# Patient Record
Sex: FEMALE | Race: WHITE | NOT HISPANIC OR LATINO | Employment: FULL TIME | URBAN - METROPOLITAN AREA
[De-identification: names, ages, dates, MRNs, and addresses within clinical notes are randomized per-mention and may not be internally consistent; named-entity substitution may affect disease eponyms.]

---

## 2019-04-19 ENCOUNTER — APPOINTMENT (OUTPATIENT)
Dept: WOMENS IMAGING | Facility: HOSPITAL | Age: 39
End: 2019-04-19

## 2019-04-19 PROCEDURE — 77063 BREAST TOMOSYNTHESIS BI: CPT | Performed by: RADIOLOGY

## 2019-04-19 PROCEDURE — 77067 SCR MAMMO BI INCL CAD: CPT | Performed by: RADIOLOGY

## 2020-09-25 ENCOUNTER — APPOINTMENT (OUTPATIENT)
Dept: WOMENS IMAGING | Facility: HOSPITAL | Age: 40
End: 2020-09-25

## 2020-09-25 PROCEDURE — 77063 BREAST TOMOSYNTHESIS BI: CPT | Performed by: RADIOLOGY

## 2020-09-25 PROCEDURE — 77067 SCR MAMMO BI INCL CAD: CPT | Performed by: RADIOLOGY

## 2020-10-14 ENCOUNTER — TRANSCRIBE ORDERS (OUTPATIENT)
Dept: PREADMISSION TESTING | Facility: HOSPITAL | Age: 40
End: 2020-10-14

## 2020-10-14 DIAGNOSIS — Z01.818 OTHER SPECIFIED PRE-OPERATIVE EXAMINATION: Primary | ICD-10-CM

## 2020-10-16 ENCOUNTER — APPOINTMENT (OUTPATIENT)
Dept: PREADMISSION TESTING | Facility: HOSPITAL | Age: 40
End: 2020-10-16

## 2020-10-17 ENCOUNTER — APPOINTMENT (OUTPATIENT)
Dept: LAB | Facility: HOSPITAL | Age: 40
End: 2020-10-17

## 2020-11-02 ENCOUNTER — APPOINTMENT (OUTPATIENT)
Dept: PREADMISSION TESTING | Facility: HOSPITAL | Age: 40
End: 2020-11-02

## 2020-11-02 VITALS
DIASTOLIC BLOOD PRESSURE: 83 MMHG | HEART RATE: 73 BPM | SYSTOLIC BLOOD PRESSURE: 128 MMHG | HEIGHT: 67 IN | RESPIRATION RATE: 16 BRPM | TEMPERATURE: 98.1 F | WEIGHT: 217.5 LBS | BODY MASS INDEX: 34.14 KG/M2 | OXYGEN SATURATION: 98 %

## 2020-11-02 DIAGNOSIS — Z01.818 OTHER SPECIFIED PRE-OPERATIVE EXAMINATION: ICD-10-CM

## 2020-11-02 LAB
ANION GAP SERPL CALCULATED.3IONS-SCNC: 7.3 MMOL/L (ref 5–15)
BUN SERPL-MCNC: 10 MG/DL (ref 6–20)
BUN/CREAT SERPL: 14.1 (ref 7–25)
CALCIUM SPEC-SCNC: 9 MG/DL (ref 8.6–10.5)
CHLORIDE SERPL-SCNC: 109 MMOL/L (ref 98–107)
CO2 SERPL-SCNC: 23.7 MMOL/L (ref 22–29)
CREAT SERPL-MCNC: 0.71 MG/DL (ref 0.57–1)
DEPRECATED RDW RBC AUTO: 41.8 FL (ref 37–54)
ERYTHROCYTE [DISTWIDTH] IN BLOOD BY AUTOMATED COUNT: 14.3 % (ref 12.3–15.4)
GFR SERPL CREATININE-BSD FRML MDRD: 91 ML/MIN/1.73
GLUCOSE SERPL-MCNC: 107 MG/DL (ref 65–99)
HCG SERPL QL: NEGATIVE
HCT VFR BLD AUTO: 33.6 % (ref 34–46.6)
HGB BLD-MCNC: 10.9 G/DL (ref 12–15.9)
MCH RBC QN AUTO: 26.1 PG (ref 26.6–33)
MCHC RBC AUTO-ENTMCNC: 32.4 G/DL (ref 31.5–35.7)
MCV RBC AUTO: 80.4 FL (ref 79–97)
PLATELET # BLD AUTO: 317 10*3/MM3 (ref 140–450)
PMV BLD AUTO: 8.5 FL (ref 6–12)
POTASSIUM SERPL-SCNC: 4.6 MMOL/L (ref 3.5–5.2)
RBC # BLD AUTO: 4.18 10*6/MM3 (ref 3.77–5.28)
SODIUM SERPL-SCNC: 140 MMOL/L (ref 136–145)
WBC # BLD AUTO: 6.53 10*3/MM3 (ref 3.4–10.8)

## 2020-11-02 PROCEDURE — 80048 BASIC METABOLIC PNL TOTAL CA: CPT | Performed by: UROLOGY

## 2020-11-02 PROCEDURE — U0004 COV-19 TEST NON-CDC HGH THRU: HCPCS | Performed by: INTERNAL MEDICINE

## 2020-11-02 PROCEDURE — C9803 HOPD COVID-19 SPEC COLLECT: HCPCS

## 2020-11-02 PROCEDURE — 84703 CHORIONIC GONADOTROPIN ASSAY: CPT | Performed by: UROLOGY

## 2020-11-02 PROCEDURE — 85027 COMPLETE CBC AUTOMATED: CPT | Performed by: UROLOGY

## 2020-11-02 PROCEDURE — 36415 COLL VENOUS BLD VENIPUNCTURE: CPT

## 2020-11-02 RX ORDER — ESTRADIOL AND NORETHINDRONE ACETATE 1; .5 MG/1; MG/1
1 TABLET ORAL DAILY
COMMUNITY

## 2020-11-02 NOTE — DISCHARGE INSTRUCTIONS
Take the following medications the morning of surgery with a small sip of water: LANSOPRAZOLE AND PLEASE BRING ALBUTEROL WITH YOU DAY OF SURGERY    ARRIVAL TIME 1:00 ACCORDING TO DR JI'S OFFICE      If you are on prescription narcotic pain medication to control your pain you may also take that medication the morning of surgery.    General Instructions:  • Do not eat or drink anything after midnight the night before surgery.  • Infants may have breast milk up to four hours before surgery.  • Infants drinking formula may drink formula up to six hours before surgery.   • Patients who avoid smoking, chewing tobacco and alcohol for 4 weeks prior to surgery have a reduced risk of post-operative complications.  Quit smoking as many days before surgery as you can.  • Do not smoke, use chewing tobacco or drink alcohol the day of surgery.   • If applicable bring your C-PAP/ BI-PAP machine.  • Bring any papers given to you in the doctor’s office.  • Wear clean comfortable clothes.  • Do not wear contact lenses, false eyelashes or make-up.  Bring a case for your glasses.   • Bring crutches or walker if applicable.  • Remove all piercings.  Leave jewelry and any other valuables at home.  • Hair extensions with metal clips must be removed prior to surgery.  • The Pre-Admission Testing nurse will instruct you to bring medications if unable to obtain an accurate list in Pre-Admission Testing.            Preventing a Surgical Site Infection:  • For 2 to 3 days before surgery, avoid shaving with a razor because the razor can irritate skin and make it easier to develop an infection.    • Any areas of open skin can increase the risk of a post-operative wound infection by allowing bacteria to enter and travel throughout the body.  Notify your surgeon if you have any skin wounds / rashes even if it is not near the expected surgical site.  The area will need assessed to determine if surgery should be delayed until it is healed.  • The  night prior to surgery shower using a fresh bar of anti-bacterial soap (such as Dial) and clean washcloth.  Sleep in a clean bed with clean clothing.  Do not allow pets to sleep with you.  • Shower on the morning of surgery using a fresh bar of anti-bacterial soap (such as Dial) and clean washcloth.  Dry with a clean towel and dress in clean clothing.  • Ask your surgeon if you will be receiving antibiotics prior to surgery.  • Make sure you, your family, and all healthcare providers clean their hands with soap and water or an alcohol based hand  before caring for you or your wound.    Day of surgery:  Your arrival time is approximately two hours before your scheduled surgery time.  Upon arrival, a Pre-op nurse and Anesthesiologist will review your health history, obtain vital signs, and answer questions you may have.  The only belongings needed at this time will be your home medications and if applicable your C-PAP/BI-PAP machine.  If you are staying overnight your family can leave the rest of your belongings in the car and bring them to your room later.  A Pre-op nurse will start an IV and you may receive medication in preparation for surgery, including something to help you relax.  While you are in surgery your family should notify the waiting room  if they leave the waiting room area and provide a contact phone number.    Please be aware that surgery does come with discomfort.  We want to make every effort to control your discomfort so please discuss any uncontrolled symptoms with your nurse.   Your doctor will most likely have prescribed pain medications.      If you are going home after surgery you will receive individualized written care instructions before being discharged.  A responsible adult must drive you to and from the hospital on the day of your surgery and stay with you for 24 hours.    If you are staying overnight following surgery, you will be transported to your hospital room  following the recovery period.  [facility] has all private rooms.    If you have any questions please call Pre-Admission Testing at [phone].  Deductibles and co-payments are collected on the day of service. Please be prepared to pay the required co-pay, deductible or deposit on the day of service as defined by your plan.    Patient Education for Self-Quarantine Process    Following your COVID testing, we strongly recommend that you do not leave your home after you have been tested for COVID except to get medical care. This includes not going to work, school or to public areas.  If this is not possible for you to do please limit your activities to only required outings.  Be sure to wear a mask when you are with other people, practice social distancing and wash your hands frequently.      The following items provide additional details to keep you safe.  • Wash your hands with soap and water frequently for at least 20 seconds.   • Avoid touching your eyes, nose and mouth with unwashed hands.  • Do not share anything - utensils, towels, food from the same bowl.   • Have your own utensils, drinking glass, dishes, towels and bedding.   • Do not have visitors.   • Do use FaceTime to stay in touch with family and friends.  • You should stay in a specific room away from others if possible.   • Stay at least 6 feet away from others in the home if you cannot have a dedicated room to yourself.   • Do not snuggle with your pet. While the CDC says there is no evidence that pets can spread COVID-19 or be infected from humans, it is probably best to avoid “petting, snuggling, being kissed or licked and sharing food (during self-quarantine)”, according to the CDC.   • Sanitize household surfaces daily. Include all high touch areas (door handles, light switches, phones, countertops, etc.)  • Do not share a bathroom with others, if possible.   • Wear a mask around others in your home if you are unable to stay in a  separate room or 6 feet apart. If  you are unable to wear a mask, have your family member wear a mask if they must be within 6 feet of you.   Call your surgeon immediately if you experience any of the following symptoms:  • Sore Throat  • Shortness of Breath or difficulty breathing  • Cough  • Chills  • Body soreness or muscle pain  • Headache  • Fever  • New loss of taste or smell  • Do not arrive for your surgery ill.  Your procedure will need to be rescheduled to another time.  You will need to call your physician before the day of surgery to avoid any unnecessary exposure to hospital staff as well as other patients.

## 2020-11-03 LAB — SARS-COV-2 RNA RESP QL NAA+PROBE: NOT DETECTED

## 2020-11-04 ENCOUNTER — ANESTHESIA EVENT (OUTPATIENT)
Dept: PERIOP | Facility: HOSPITAL | Age: 40
End: 2020-11-04

## 2020-11-04 ENCOUNTER — HOSPITAL ENCOUNTER (OUTPATIENT)
Facility: HOSPITAL | Age: 40
Setting detail: HOSPITAL OUTPATIENT SURGERY
Discharge: HOME | End: 2020-11-04
Attending: UROLOGY | Admitting: UROLOGY

## 2020-11-04 ENCOUNTER — ANESTHESIA (OUTPATIENT)
Dept: PERIOP | Facility: HOSPITAL | Age: 40
End: 2020-11-04

## 2020-11-04 VITALS
HEIGHT: 68 IN | BODY MASS INDEX: 32.96 KG/M2 | SYSTOLIC BLOOD PRESSURE: 134 MMHG | HEART RATE: 62 BPM | DIASTOLIC BLOOD PRESSURE: 81 MMHG | RESPIRATION RATE: 16 BRPM | WEIGHT: 217.5 LBS | OXYGEN SATURATION: 100 % | TEMPERATURE: 97.9 F

## 2020-11-04 DIAGNOSIS — N36.8 URETHRAL CYST: ICD-10-CM

## 2020-11-04 PROCEDURE — 25010000002 DEXAMETHASONE PER 1 MG: Performed by: ANESTHESIOLOGY

## 2020-11-04 PROCEDURE — 88305 TISSUE EXAM BY PATHOLOGIST: CPT | Performed by: UROLOGY

## 2020-11-04 PROCEDURE — 25010000002 PROPOFOL 10 MG/ML EMULSION: Performed by: ANESTHESIOLOGY

## 2020-11-04 PROCEDURE — 25010000002 ONDANSETRON PER 1 MG: Performed by: ANESTHESIOLOGY

## 2020-11-04 PROCEDURE — 25010000002 LEVOFLOXACIN PER 250 MG: Performed by: UROLOGY

## 2020-11-04 PROCEDURE — 25010000002 FENTANYL CITRATE (PF) 100 MCG/2ML SOLUTION: Performed by: ANESTHESIOLOGY

## 2020-11-04 PROCEDURE — 25010000002 KETOROLAC TROMETHAMINE PER 15 MG: Performed by: ANESTHESIOLOGY

## 2020-11-04 PROCEDURE — 25010000002 MIDAZOLAM PER 1 MG: Performed by: ANESTHESIOLOGY

## 2020-11-04 RX ORDER — LIDOCAINE HYDROCHLORIDE 20 MG/ML
INJECTION, SOLUTION INFILTRATION; PERINEURAL AS NEEDED
Status: DISCONTINUED | OUTPATIENT
Start: 2020-11-04 | End: 2020-11-04 | Stop reason: SURG

## 2020-11-04 RX ORDER — MEPERIDINE HYDROCHLORIDE 25 MG/ML
12.5 INJECTION INTRAMUSCULAR; INTRAVENOUS; SUBCUTANEOUS ONCE
Status: DISCONTINUED | OUTPATIENT
Start: 2020-11-04 | End: 2020-11-04 | Stop reason: HOSPADM

## 2020-11-04 RX ORDER — SODIUM CHLORIDE, SODIUM LACTATE, POTASSIUM CHLORIDE, CALCIUM CHLORIDE 600; 310; 30; 20 MG/100ML; MG/100ML; MG/100ML; MG/100ML
100 INJECTION, SOLUTION INTRAVENOUS CONTINUOUS
Status: DISCONTINUED | OUTPATIENT
Start: 2020-11-04 | End: 2020-11-04 | Stop reason: HOSPADM

## 2020-11-04 RX ORDER — SODIUM CHLORIDE 0.9 % (FLUSH) 0.9 %
3 SYRINGE (ML) INJECTION EVERY 12 HOURS SCHEDULED
Status: DISCONTINUED | OUTPATIENT
Start: 2020-11-04 | End: 2020-11-04 | Stop reason: HOSPADM

## 2020-11-04 RX ORDER — MIDAZOLAM HYDROCHLORIDE 1 MG/ML
1 INJECTION INTRAMUSCULAR; INTRAVENOUS
Status: DISCONTINUED | OUTPATIENT
Start: 2020-11-04 | End: 2020-11-04 | Stop reason: HOSPADM

## 2020-11-04 RX ORDER — PROPOFOL 10 MG/ML
VIAL (ML) INTRAVENOUS AS NEEDED
Status: DISCONTINUED | OUTPATIENT
Start: 2020-11-04 | End: 2020-11-04 | Stop reason: SURG

## 2020-11-04 RX ORDER — DEXAMETHASONE SODIUM PHOSPHATE 10 MG/ML
INJECTION INTRAMUSCULAR; INTRAVENOUS AS NEEDED
Status: DISCONTINUED | OUTPATIENT
Start: 2020-11-04 | End: 2020-11-04 | Stop reason: SURG

## 2020-11-04 RX ORDER — FENTANYL CITRATE 50 UG/ML
25 INJECTION, SOLUTION INTRAMUSCULAR; INTRAVENOUS
Status: DISCONTINUED | OUTPATIENT
Start: 2020-11-04 | End: 2020-11-04 | Stop reason: HOSPADM

## 2020-11-04 RX ORDER — LABETALOL HYDROCHLORIDE 5 MG/ML
5 INJECTION, SOLUTION INTRAVENOUS
Status: DISCONTINUED | OUTPATIENT
Start: 2020-11-04 | End: 2020-11-04 | Stop reason: HOSPADM

## 2020-11-04 RX ORDER — HYDROCODONE BITARTRATE AND ACETAMINOPHEN 5; 325 MG/1; MG/1
1 TABLET ORAL EVERY 6 HOURS PRN
Qty: 20 TABLET | Refills: 0 | Status: SHIPPED | OUTPATIENT
Start: 2020-11-04

## 2020-11-04 RX ORDER — LIDOCAINE HYDROCHLORIDE AND EPINEPHRINE 10; 10 MG/ML; UG/ML
INJECTION, SOLUTION INFILTRATION; PERINEURAL AS NEEDED
Status: DISCONTINUED | OUTPATIENT
Start: 2020-11-04 | End: 2020-11-04 | Stop reason: HOSPADM

## 2020-11-04 RX ORDER — FAMOTIDINE 10 MG/ML
20 INJECTION, SOLUTION INTRAVENOUS ONCE
Status: COMPLETED | OUTPATIENT
Start: 2020-11-04 | End: 2020-11-04

## 2020-11-04 RX ORDER — ULTRASOUND COUPLING MEDIUM
GEL (GRAM) TOPICAL AS NEEDED
Status: DISCONTINUED | OUTPATIENT
Start: 2020-11-04 | End: 2020-11-04 | Stop reason: HOSPADM

## 2020-11-04 RX ORDER — ONDANSETRON 2 MG/ML
4 INJECTION INTRAMUSCULAR; INTRAVENOUS ONCE AS NEEDED
Status: DISCONTINUED | OUTPATIENT
Start: 2020-11-04 | End: 2020-11-04 | Stop reason: HOSPADM

## 2020-11-04 RX ORDER — LEVOFLOXACIN 5 MG/ML
500 INJECTION, SOLUTION INTRAVENOUS ONCE
Status: COMPLETED | OUTPATIENT
Start: 2020-11-04 | End: 2020-11-04

## 2020-11-04 RX ORDER — LIDOCAINE HYDROCHLORIDE 10 MG/ML
0.5 INJECTION, SOLUTION EPIDURAL; INFILTRATION; INTRACAUDAL; PERINEURAL ONCE AS NEEDED
Status: DISCONTINUED | OUTPATIENT
Start: 2020-11-04 | End: 2020-11-04 | Stop reason: HOSPADM

## 2020-11-04 RX ORDER — SODIUM CHLORIDE 0.9 % (FLUSH) 0.9 %
3-10 SYRINGE (ML) INJECTION AS NEEDED
Status: DISCONTINUED | OUTPATIENT
Start: 2020-11-04 | End: 2020-11-04 | Stop reason: HOSPADM

## 2020-11-04 RX ORDER — HYDROCODONE BITARTRATE AND ACETAMINOPHEN 5; 325 MG/1; MG/1
1 TABLET ORAL EVERY 6 HOURS PRN
Status: DISCONTINUED | OUTPATIENT
Start: 2020-11-04 | End: 2020-11-04 | Stop reason: HOSPADM

## 2020-11-04 RX ORDER — MAGNESIUM HYDROXIDE 1200 MG/15ML
LIQUID ORAL AS NEEDED
Status: DISCONTINUED | OUTPATIENT
Start: 2020-11-04 | End: 2020-11-04 | Stop reason: HOSPADM

## 2020-11-04 RX ORDER — CIPROFLOXACIN 500 MG/1
500 TABLET, FILM COATED ORAL 2 TIMES DAILY
Qty: 10 TABLET | Refills: 0 | Status: SHIPPED | OUTPATIENT
Start: 2020-11-04

## 2020-11-04 RX ORDER — NALOXONE HCL 0.4 MG/ML
0.4 VIAL (ML) INJECTION AS NEEDED
Status: DISCONTINUED | OUTPATIENT
Start: 2020-11-04 | End: 2020-11-04 | Stop reason: HOSPADM

## 2020-11-04 RX ORDER — MORPHINE SULFATE 2 MG/ML
2 INJECTION, SOLUTION INTRAMUSCULAR; INTRAVENOUS
Status: DISCONTINUED | OUTPATIENT
Start: 2020-11-04 | End: 2020-11-04 | Stop reason: HOSPADM

## 2020-11-04 RX ORDER — ONDANSETRON 2 MG/ML
INJECTION INTRAMUSCULAR; INTRAVENOUS AS NEEDED
Status: DISCONTINUED | OUTPATIENT
Start: 2020-11-04 | End: 2020-11-04 | Stop reason: SURG

## 2020-11-04 RX ORDER — FENTANYL CITRATE 50 UG/ML
INJECTION, SOLUTION INTRAMUSCULAR; INTRAVENOUS AS NEEDED
Status: DISCONTINUED | OUTPATIENT
Start: 2020-11-04 | End: 2020-11-04 | Stop reason: SURG

## 2020-11-04 RX ORDER — SODIUM CHLORIDE, SODIUM LACTATE, POTASSIUM CHLORIDE, CALCIUM CHLORIDE 600; 310; 30; 20 MG/100ML; MG/100ML; MG/100ML; MG/100ML
9 INJECTION, SOLUTION INTRAVENOUS CONTINUOUS
Status: DISCONTINUED | OUTPATIENT
Start: 2020-11-04 | End: 2020-11-04 | Stop reason: HOSPADM

## 2020-11-04 RX ORDER — KETOROLAC TROMETHAMINE 30 MG/ML
INJECTION, SOLUTION INTRAMUSCULAR; INTRAVENOUS AS NEEDED
Status: DISCONTINUED | OUTPATIENT
Start: 2020-11-04 | End: 2020-11-04 | Stop reason: SURG

## 2020-11-04 RX ORDER — EPHEDRINE SULFATE 50 MG/ML
5 INJECTION, SOLUTION INTRAVENOUS ONCE AS NEEDED
Status: DISCONTINUED | OUTPATIENT
Start: 2020-11-04 | End: 2020-11-04 | Stop reason: HOSPADM

## 2020-11-04 RX ADMIN — FENTANYL CITRATE 50 MCG: 50 INJECTION INTRAMUSCULAR; INTRAVENOUS at 15:11

## 2020-11-04 RX ADMIN — FAMOTIDINE 20 MG: 10 INJECTION, SOLUTION INTRAVENOUS at 14:14

## 2020-11-04 RX ADMIN — PROPOFOL 150 MG: 10 INJECTION, EMULSION INTRAVENOUS at 15:15

## 2020-11-04 RX ADMIN — LEVOFLOXACIN 500 MG: 5 INJECTION, SOLUTION INTRAVENOUS at 14:18

## 2020-11-04 RX ADMIN — DEXAMETHASONE SODIUM PHOSPHATE 8 MG: 10 INJECTION INTRAMUSCULAR; INTRAVENOUS at 15:19

## 2020-11-04 RX ADMIN — FENTANYL CITRATE 50 MCG: 50 INJECTION INTRAMUSCULAR; INTRAVENOUS at 15:40

## 2020-11-04 RX ADMIN — ONDANSETRON HYDROCHLORIDE 4 MG: 2 SOLUTION INTRAMUSCULAR; INTRAVENOUS at 15:19

## 2020-11-04 RX ADMIN — SODIUM CHLORIDE, POTASSIUM CHLORIDE, SODIUM LACTATE AND CALCIUM CHLORIDE 9 ML/HR: 600; 310; 30; 20 INJECTION, SOLUTION INTRAVENOUS at 13:34

## 2020-11-04 RX ADMIN — MIDAZOLAM 1 MG: 1 INJECTION INTRAMUSCULAR; INTRAVENOUS at 14:37

## 2020-11-04 RX ADMIN — KETOROLAC TROMETHAMINE 30 MG: 30 INJECTION, SOLUTION INTRAMUSCULAR; INTRAVENOUS at 15:40

## 2020-11-04 RX ADMIN — HYDROCODONE BITARTRATE AND ACETAMINOPHEN 1 TABLET: 5; 325 TABLET ORAL at 16:27

## 2020-11-04 RX ADMIN — LIDOCAINE HYDROCHLORIDE 100 MG: 20 INJECTION, SOLUTION INFILTRATION; PERINEURAL at 15:11

## 2020-11-04 NOTE — H&P
FIRST UROLOGY CONSULT      Patient Identification:  NAME:  Mel Calero  Age:  40 y.o.   Sex:  female   :  1980   MRN:  [id number]       Chief complaint: Urethral cyst.      History of present illness:  H/o urethral cyst for excision.      Past medical history:  Past Medical History:   Diagnosis Date   • Anxiety    • Asthma    • Eczema    • Esophageal stricture    • GERD (gastroesophageal reflux disease)    • History of uterine fibroid    • Migraines    • Psoriasis of scalp    • Seasonal allergies    • Urethral cyst        Past surgical history:  Past Surgical History:   Procedure Laterality Date   • ENDOSCOPY     • ESOPHAGEAL DILATATION     • MYOMECTOMY     • RETINAL DETACHMENT REPAIR         Allergies:  Contrast dye and Dicyclomine    Home medications:  Medications Prior to Admission   Medication Sig Dispense Refill Last Dose   • estradiol-norethindrone (ACTIVELLA) 1-0.5 MG per tablet Take 1 tablet by mouth Daily.   2020 at 0830   • fexofenadine-pseudoephedrine (ALLEGRA-D 24) 180-240 MG per 24 hr tablet Take 1 tablet by mouth Daily.   2020 at 0830   • fluticasone (FLONASE) 50 MCG/ACT nasal spray 2 sprays into the nostril(s) as directed by provider Daily. 1 bottle 0 Past Month at Unknown time   • lansoprazole (PREVACID) 30 MG capsule Take 30 mg by mouth Daily.   2020 at 0830   • ZOLMitriptan (ZOMIG) 2.5 MG tablet Take 2.5 mg by mouth Continuous As Needed for Migraine.   Past Week at Unknown time   • albuterol sulfate HFA (PROAIR HFA) 108 (90 Base) MCG/ACT inhaler Inhale 2 puffs 2 (Two) Times a Day As Needed.   2020   • ALPRAZolam (XANAX) 0.5 MG tablet Take 2 tablets by mouth 2 (Two) Times a Day As Needed.   More than a month at Unknown time   • ibuprofen (ADVIL,MOTRIN) 400 MG tablet Take 1 tablet by mouth Every 4 (Four) Hours. PT TO STOP PER MD INSTRUCTION   10/25/2020   • melatonin 1 MG tablet At Night As Needed.   More than a month at Unknown time   • olopatadine  (PATANOL) 0.1 % ophthalmic solution Administer 1 drop to both eyes 2 (Two) Times a Day As Needed.  3 More than a month at Unknown time        Hospital medications:  famotidine, 20 mg, Intravenous, Once  levoFLOXacin, 500 mg, Intravenous, Once  sodium chloride, 3 mL, Intravenous, Q12H      lactated ringers, 9 mL/hr      lidocaine PF 1%  •  midazolam  •  sodium chloride    Family history:  Family History   Problem Relation Age of Onset   • Malig Hyperthermia Neg Hx        Social history:  Social History     Tobacco Use   • Smoking status: Never Smoker   • Smokeless tobacco: Never Used   Substance Use Topics   • Alcohol use: Yes     Comment: once per month   • Drug use: Never       Review of systems:      Positive for:  Urethral cyst.    Negative for:  Fever.      Objective:  TMax 24 hours:   Temp (24hrs), Av.8 °F (37.1 °C), Min:98.8 °F (37.1 °C), Max:98.8 °F (37.1 °C)      Vitals Ranges:   Temp:  [98.8 °F (37.1 °C)] 98.8 °F (37.1 °C)  Heart Rate:  [81] 81  Resp:  [18] 18  BP: (119)/(71) 119/71    Intake/Output Last 3 shifts:  No intake/output data recorded.     Physical Exam:    General Appearance:    Alert, cooperative, NAD   HEENT:    No trauma, pupils reactive, hearing intact   Back:     No CVA tenderness   Lungs:     Respirations unlabored, no wheezing    Heart:    RRR.   Abdomen:     Soft, NDNT, no masses, no guarding   :    Pelvic not performed, bladder nondistended and nontender   Extremities:   No edema, no deformity   Lymphatic:   No neck or groin LAD   Skin:   No bleeding.   Neuro/Psych:   Orientation intact, mood/affect pleasant, no focal findings       Results review:   I reviewed the patient's new clinical results.    Data review:  Lab Results (last 24 hours)     ** No results found for the last 24 hours. **           Imaging:  Imaging Results (Last 24 Hours)     ** No results found for the last 24 hours. **             Assessment:       * No active hospital problems. *    Urethral cyst.      Plan:      Cysto excision of urethral cyst.  R/B d/w pt.      Shoaib Baxter MD  11/04/20  14:11 EST

## 2020-11-04 NOTE — OP NOTE
PREOPERATIVE DIAGNOSIS: Urethral cyst.    POSTOPERATIVE DIAGNOSIS: Same    PROCEDURE: Cystoscopy with excision of urethral cyst.    SURGEON:  Shoaib Baxter MD    ASSISTANT: None    ANESTHESIA: General    EBL: None    DRAINS: 16 Maltese Gomez catheter.    COMPLICATIONS: None    FINDINGS: 2 cm urethral cyst.    INDICATIONS FOR PROCEDURE: History of a large mid urethral cyst.  Patient tried a dose of antibiotics that did not improve her symptoms.  She presents today for cystoscopy with excision of urethral cyst.  Risk and benefits were explained include but not limited to bleeding, infection, damage to urethra and bladder.  Recurrence was discussed.  Patient consented to the procedure.      DESCRIPTION OF PROCEDURE: After receiving IV antibiotics he was taken to the operative suite placed in supine position.  She underwent a general anesthesia.  After adequate anesthesia was obtained she placed in dorsolithotomy position.  Her groin was prepped and draped sterile fashion.  A 16 Maltese Gomez catheter was placed.  10 cc replaced and balloon Gomez bags placed.  2 oh silks were used to displace the labia laterally.  A weighted speculum was placed in the vagina.  Core percent Marcaine with epinephrine was used local anesthetic on the mid urethra.  A 15 blade knife was used to make a 2 cm midline mid urethral incision.  Metzenbaum scissors were used to then dissect around the cyst.  Toward the end I unfortunately cut into the cyst and she had some purulence.  I was able to resect the cyst and passed off table specimen.  I then check to see if there was any evidence of urethral involvement.  There did not appear to be involved in the urethra.  I did remove the 6 Gomez in place a flexible cystoscope into the urethra and into the bladder.  There appeared to be no urethral involvement.  Cystoscope was removed.  Because the urethra was so thin I decided to place a Gomez for a few more days.  10 cc replaced and balloon Gomez bag is  placed.  I then closed the vaginal incision with a running 4-0 Vicryl.  She was then extubated taken recovery in satisfactory condition.  She tolerated the procedure well.

## 2020-11-04 NOTE — ANESTHESIA PREPROCEDURE EVALUATION
Anesthesia Evaluation     Patient summary reviewed and Nursing notes reviewed   NPO Solid Status: > 8 hours  NPO Liquid Status: > 8 hours           Airway   Mallampati: II  TM distance: >3 FB  Neck ROM: full  no difficulty expected  Dental - normal exam     Pulmonary - normal exam   (+) asthma,  Cardiovascular - normal exam        Neuro/Psych  (+) headaches, psychiatric history Anxiety,     GI/Hepatic/Renal/Endo    (+)  GERD,      Musculoskeletal     Abdominal  - normal exam   Substance History      OB/GYN          Other                        Anesthesia Plan    ASA 2     general     intravenous induction     Anesthetic plan, all risks, benefits, and alternatives have been provided, discussed and informed consent has been obtained with: patient.

## 2020-11-04 NOTE — ANESTHESIA POSTPROCEDURE EVALUATION
"Patient: Mel Calero    Procedure Summary     Date: 11/04/20 Room / Location: [facility] OR [address] [place] [address] MAIN OR    Anesthesia Start: 1504 Anesthesia Stop: 1600    Procedure: EXCISION OF URETHRAL CYST AND CYSTOSCOPY (N/A ) Diagnosis:     Surgeon: Shoaib Baxter MD Provider: Citlalli Lemus MD    Anesthesia Type: general ASA Status: 2          Anesthesia Type: general    Vitals  Vitals Value Taken Time   /91 11/04/20 1630   Temp 36.6 °C (97.9 °F) 11/04/20 1630   Pulse 116 11/04/20 1631   Resp 16 11/04/20 1630   SpO2 99 % 11/04/20 1632   Vitals shown include unvalidated device data.        Post Anesthesia Care and Evaluation    Patient location during evaluation: bedside  Patient participation: complete - patient participated  Level of consciousness: awake and alert  Pain management: adequate  Airway patency: patent  Anesthetic complications: No anesthetic complications    Cardiovascular status: acceptable  Respiratory status: acceptable  Hydration status: acceptable    Comments: /81   Pulse 62   Temp 36.6 °C (97.9 °F)   Resp 16   Ht 172.7 cm (68\")   Wt 98.7 kg (217 lb 8 oz)   LMP 10/14/2020   SpO2 100%   Breastfeeding No   BMI 33.07 kg/m²           "

## 2020-11-04 NOTE — ANESTHESIA PROCEDURE NOTES
Airway  Urgency: elective    Date/Time: 11/4/2020 3:05 PM  End Time:11/4/2020 3:25 PM  Airway not difficult    General Information and Staff    Patient location during procedure: OR  Anesthesiologist: Citlalli Lemus MD    Indications and Patient Condition  Indications for airway management: airway protection    Preoxygenated: yes  Mask difficulty assessment: 1 - vent by mask    Final Airway Details  Final airway type: supraglottic airway      Successful airway: classic  Size 4    Number of attempts at approach: 1  Assessment: lips, teeth, and gum same as pre-op

## 2020-11-06 LAB
CYTO UR: NORMAL
LAB AP CASE REPORT: NORMAL
PATH REPORT.FINAL DX SPEC: NORMAL
PATH REPORT.GROSS SPEC: NORMAL

## 2021-04-02 ENCOUNTER — BULK ORDERING (OUTPATIENT)
Dept: CASE MANAGEMENT | Facility: OTHER | Age: 41
End: 2021-04-02

## 2021-04-02 DIAGNOSIS — Z23 IMMUNIZATION DUE: ICD-10-CM

## 2021-11-22 ENCOUNTER — APPOINTMENT (OUTPATIENT)
Dept: WOMENS IMAGING | Facility: HOSPITAL | Age: 41
End: 2021-11-22

## 2021-11-22 PROCEDURE — 77063 BREAST TOMOSYNTHESIS BI: CPT | Performed by: RADIOLOGY

## 2021-11-22 PROCEDURE — 77067 SCR MAMMO BI INCL CAD: CPT | Performed by: RADIOLOGY

## 2023-10-31 ENCOUNTER — OFFICE VISIT (OUTPATIENT)
Dept: CARDIOLOGY | Facility: CLINIC | Age: 43
End: 2023-10-31
Payer: COMMERCIAL

## 2023-10-31 VITALS
SYSTOLIC BLOOD PRESSURE: 120 MMHG | HEIGHT: 68 IN | BODY MASS INDEX: 36.37 KG/M2 | HEART RATE: 77 BPM | RESPIRATION RATE: 16 BRPM | DIASTOLIC BLOOD PRESSURE: 80 MMHG | WEIGHT: 240 LBS

## 2023-10-31 DIAGNOSIS — R00.2 PALPITATIONS: Primary | ICD-10-CM

## 2023-10-31 PROCEDURE — 93000 ELECTROCARDIOGRAM COMPLETE: CPT | Performed by: INTERNAL MEDICINE

## 2023-10-31 PROCEDURE — 99204 OFFICE O/P NEW MOD 45 MIN: CPT | Performed by: INTERNAL MEDICINE

## 2023-10-31 RX ORDER — MONTELUKAST SODIUM 10 MG/1
10 TABLET ORAL NIGHTLY
COMMUNITY

## 2023-10-31 RX ORDER — LANOLIN ALCOHOL/MO/W.PET/CERES
1000 CREAM (GRAM) TOPICAL DAILY
COMMUNITY

## 2023-10-31 RX ORDER — VITAMIN B COMPLEX
1 CAPSULE ORAL DAILY
COMMUNITY

## 2023-10-31 RX ORDER — PRENATAL VIT/IRON FUM/FOLIC AC 27MG-0.8MG
TABLET ORAL DAILY
COMMUNITY

## 2023-10-31 RX ORDER — CETIRIZINE HYDROCHLORIDE 5 MG/1
5 TABLET ORAL DAILY
COMMUNITY

## 2023-10-31 RX ORDER — BUDESONIDE AND FORMOTEROL FUMARATE DIHYDRATE 80; 4.5 UG/1; UG/1
2 AEROSOL RESPIRATORY (INHALATION) ONCE
COMMUNITY

## 2023-10-31 NOTE — PROGRESS NOTES
"      CARDIOLOGY    Leigh Prasad MD    ENCOUNTER DATE:  10/31/2023    Mel Calero / 43 y.o. / female        CHIEF COMPLAINT / REASON FOR OFFICE VISIT     Heart Problem (Wanting to establish care /)      HISTORY OF PRESENT ILLNESS       HPI    Mel Calero is a 43 y.o. female     This is a nice lady.  Her father has hypertrophic cardiomyopathy and has had numerous atrial fibrillation ablations, some of which have been with Dr. Swanson.  She was checked many years ago but I do not have any records of it, but everything was normal at that time.  She has been having more palpitations.  She has been going through some hormonal changes but she has just been noticing more palpitations, not associated with syncope but does have some dizziness.          REVIEW OF SYSTEMS     Review of Systems   Constitutional: Positive for weight gain. Negative for chills, fever and weight loss.   Cardiovascular:  Positive for leg swelling.   Respiratory:  Positive for snoring. Negative for cough and wheezing.    Hematologic/Lymphatic: Negative for bleeding problem. Bruises/bleeds easily.   Skin:  Negative for color change.   Musculoskeletal:  Negative for falls, joint pain and myalgias.   Gastrointestinal:  Negative for melena.   Genitourinary:  Negative for hematuria.   Neurological:  Negative for excessive daytime sleepiness.   Psychiatric/Behavioral:  Negative for depression. The patient is not nervous/anxious.          VITAL SIGNS     Visit Vitals  /80 (BP Location: Left arm, Patient Position: Sitting, Cuff Size: Large Adult)   Pulse 77   Resp 16   Ht 172.7 cm (68\")   Wt 109 kg (240 lb)   BMI 36.49 kg/m²         Wt Readings from Last 3 Encounters:   10/31/23 109 kg (240 lb)   11/04/20 98.7 kg (217 lb 8 oz)   11/02/20 98.7 kg (217 lb 8 oz)     Body mass index is 36.49 kg/m².      PHYSICAL EXAMINATION     Constitutional:       General: Not in acute distress.  Neck:      Vascular: No carotid bruit or JVD.   Pulmonary:    "   Effort: Pulmonary effort is normal.      Breath sounds: Normal breath sounds.   Cardiovascular:      Normal rate. Regular rhythm.      Murmurs: There is no murmur.   Psychiatric:         Mood and Affect: Mood and affect normal.           REVIEWED DATA       ECG 12 Lead    Date/Time: 10/31/2023 9:15 AM  Performed by: Leigh Prasad MD    Authorized by: Leigh Prasad MD  Previous ECG: no previous ECG available  Rhythm: sinus rhythm  BPM: 77  Conduction: conduction normal  ST Segments: ST segments normal    Clinical impression: normal ECG                Lab Results   Component Value Date    GLUCOSE 107 (H) 11/02/2020    BUN 10 11/02/2020    CREATININE 0.71 11/02/2020    BCR 14.1 11/02/2020    K 4.6 11/02/2020    CO2 23.7 11/02/2020    CALCIUM 9.0 11/02/2020       ASSESSMENT & PLAN      Diagnosis Plan   1. Palpitations  Adult Transthoracic Echo Complete W/ Cont if Necessary Per Protocol    Holter Monitor - 72 Hour Up To 15 Days        Palpitations.  I recommend a Zio patch to see if she is having any kind of arrhythmia.  She does have a family history of atrial fibrillation.  Family history of hypertrophic cardiomyopathy in her father.  Reportedly an echocardiogram many years ago was normal.  I do not hear a murmur today.  I recommend checking another echocardiogram.  I do not see anything on her EKG that suggests left ventricular hypertrophy.    One of my nurses or I will go over the results of the echocardiogram and then the monitor when it becomes available.           Orders Placed This Encounter   Procedures    Holter Monitor - 72 Hour Up To 15 Days     Standing Status:   Future     Standing Expiration Date:   10/31/2024     Order Specific Question:   Reason for exam?     Answer:   Palpitations     Order Specific Question:   How many days is the patient to wear the monitor?     Answer:   14     Order Specific Question:   Release to patient     Answer:   Routine Release [[id number]]    ECG 12 Lead     This  order was created via procedure documentation     Order Specific Question:   Release to patient     Answer:   Routine Release [[id number]]    Adult Transthoracic Echo Complete W/ Cont if Necessary Per Protocol     Standing Status:   Future     Standing Expiration Date:   10/30/2024     Order Specific Question:   Reason for exam?     Answer:   Palpitations     Order Specific Question:   Release to patient     Answer:   Routine Release [[id number]]           MEDICATIONS         Discharge Medications            Accurate as of October 31, 2023  9:21 AM. If you have any questions, ask your nurse or doctor.                Continue These Medications        Instructions Start Date   budesonide-formoterol 80-4.5 MCG/ACT inhaler  Commonly known as: SYMBICORT   2 puffs, Inhalation, Once      cetirizine 5 MG tablet  Commonly known as: zyrTEC   5 mg, Oral, Daily      fexofenadine-pseudoephedrine 180-240 MG per 24 hr tablet  Commonly known as: ALLEGRA-D 24   1 tablet, Oral, Daily      lansoprazole 30 MG capsule  Commonly known as: PREVACID   30 mg, Oral, Daily      melatonin 1 MG tablet   Nightly PRN      montelukast 10 MG tablet  Commonly known as: SINGULAIR   10 mg, Oral, Nightly      prenatal vitamin 27-0.8 27-0.8 MG tablet tablet   Oral, Daily      ProAir  (90 Base) MCG/ACT inhaler  Generic drug: albuterol sulfate HFA   2 puffs, Inhalation, 2 Times Daily PRN      vitamin b complex capsule capsule   1 capsule, Oral, Daily      vitamin B-12 1000 MCG tablet  Commonly known as: CYANOCOBALAMIN   1,000 mcg, Oral, Daily      Xanax 0.5 MG tablet  Generic drug: ALPRAZolam   2 tablets, Oral, 2 Times Daily PRN      ZOLMitriptan 2.5 MG tablet  Commonly known as: ZOMIG   2.5 mg, Oral, Continuous PRN                 Leigh Prasad MD  10/31/23  09:21 EDT    Part of this note may be an electronic transcription/translation of spoken language to printed text using the Dragon dictation system.

## 2023-11-14 ENCOUNTER — TELEPHONE (OUTPATIENT)
Dept: CARDIOLOGY | Facility: CLINIC | Age: 43
End: 2023-11-14
Payer: COMMERCIAL

## 2023-11-14 ENCOUNTER — HOSPITAL ENCOUNTER (OUTPATIENT)
Dept: CARDIOLOGY | Facility: HOSPITAL | Age: 43
Discharge: HOME | End: 2023-11-14
Admitting: INTERNAL MEDICINE
Payer: COMMERCIAL

## 2023-11-14 VITALS
SYSTOLIC BLOOD PRESSURE: 132 MMHG | HEIGHT: 68 IN | WEIGHT: 240 LBS | OXYGEN SATURATION: 98 % | HEART RATE: 69 BPM | BODY MASS INDEX: 36.37 KG/M2 | DIASTOLIC BLOOD PRESSURE: 76 MMHG

## 2023-11-14 DIAGNOSIS — R00.2 PALPITATIONS: ICD-10-CM

## 2023-11-14 LAB
AORTIC ARCH: 2.8 CM
ASCENDING AORTA: 3.4 CM
BH CV ECHO MEAS - ACS: 2.26 CM
BH CV ECHO MEAS - AO MAX PG: 7.4 MMHG
BH CV ECHO MEAS - AO MEAN PG: 4 MMHG
BH CV ECHO MEAS - AO ROOT DIAM: 3.4 CM
BH CV ECHO MEAS - AO V2 MAX: 136 CM/SEC
BH CV ECHO MEAS - AO V2 VTI: 30.2 CM
BH CV ECHO MEAS - AVA(I,D): 1.92 CM2
BH CV ECHO MEAS - EDV(CUBED): 97.3 ML
BH CV ECHO MEAS - EDV(MOD-SP2): 102 ML
BH CV ECHO MEAS - EDV(MOD-SP4): 124 ML
BH CV ECHO MEAS - EF(MOD-BP): 67.1 %
BH CV ECHO MEAS - EF(MOD-SP2): 64.7 %
BH CV ECHO MEAS - EF(MOD-SP4): 67.7 %
BH CV ECHO MEAS - ESV(CUBED): 23.7 ML
BH CV ECHO MEAS - ESV(MOD-SP2): 36 ML
BH CV ECHO MEAS - ESV(MOD-SP4): 40 ML
BH CV ECHO MEAS - FS: 37.5 %
BH CV ECHO MEAS - IVS/LVPW: 1 CM
BH CV ECHO MEAS - IVSD: 1 CM
BH CV ECHO MEAS - LAT PEAK E' VEL: 13.4 CM/SEC
BH CV ECHO MEAS - LV DIASTOLIC VOL/BSA (35-75): 56.1 CM2
BH CV ECHO MEAS - LV MASS(C)D: 158.8 GRAMS
BH CV ECHO MEAS - LV MAX PG: 1.84 MMHG
BH CV ECHO MEAS - LV MEAN PG: 1 MMHG
BH CV ECHO MEAS - LV SYSTOLIC VOL/BSA (12-30): 18.1 CM2
BH CV ECHO MEAS - LV V1 MAX: 67.8 CM/SEC
BH CV ECHO MEAS - LV V1 VTI: 17.4 CM
BH CV ECHO MEAS - LVIDD: 4.6 CM
BH CV ECHO MEAS - LVIDS: 2.9 CM
BH CV ECHO MEAS - LVOT AREA: 3.3 CM2
BH CV ECHO MEAS - LVOT DIAM: 2.06 CM
BH CV ECHO MEAS - LVPWD: 1 CM
BH CV ECHO MEAS - MED PEAK E' VEL: 8.3 CM/SEC
BH CV ECHO MEAS - MR MAX PG: 71.3 MMHG
BH CV ECHO MEAS - MR MAX VEL: 422.1 CM/SEC
BH CV ECHO MEAS - MV A DUR: 0.11 SEC
BH CV ECHO MEAS - MV A MAX VEL: 57.4 CM/SEC
BH CV ECHO MEAS - MV DEC SLOPE: 239.3 CM/SEC2
BH CV ECHO MEAS - MV DEC TIME: 0.19 SEC
BH CV ECHO MEAS - MV E MAX VEL: 62.9 CM/SEC
BH CV ECHO MEAS - MV E/A: 1.1
BH CV ECHO MEAS - MV MAX PG: 2.8 MMHG
BH CV ECHO MEAS - MV MEAN PG: 1.27 MMHG
BH CV ECHO MEAS - MV P1/2T: 101.3 MSEC
BH CV ECHO MEAS - MV V2 VTI: 25.2 CM
BH CV ECHO MEAS - MVA(P1/2T): 2.17 CM2
BH CV ECHO MEAS - MVA(VTI): 2.3 CM2
BH CV ECHO MEAS - PA ACC TIME: 0.15 SEC
BH CV ECHO MEAS - PA V2 MAX: 99.1 CM/SEC
BH CV ECHO MEAS - PULM A REVS DUR: 0.1 SEC
BH CV ECHO MEAS - PULM A REVS VEL: 25.2 CM/SEC
BH CV ECHO MEAS - PULM DIAS VEL: 30.2 CM/SEC
BH CV ECHO MEAS - PULM S/D: 1.31
BH CV ECHO MEAS - PULM SYS VEL: 39.6 CM/SEC
BH CV ECHO MEAS - QP/QS: 1.19
BH CV ECHO MEAS - RAP SYSTOLE: 3 MMHG
BH CV ECHO MEAS - RV MAX PG: 3.2 MMHG
BH CV ECHO MEAS - RV V1 MAX: 89.2 CM/SEC
BH CV ECHO MEAS - RV V1 VTI: 20.1 CM
BH CV ECHO MEAS - RVOT DIAM: 2.09 CM
BH CV ECHO MEAS - RVSP: 18.3 MMHG
BH CV ECHO MEAS - SI(MOD-SP2): 29.9 ML/M2
BH CV ECHO MEAS - SI(MOD-SP4): 38 ML/M2
BH CV ECHO MEAS - SUP REN AO DIAM: 2.4 CM
BH CV ECHO MEAS - SV(LVOT): 58 ML
BH CV ECHO MEAS - SV(MOD-SP2): 66 ML
BH CV ECHO MEAS - SV(MOD-SP4): 84 ML
BH CV ECHO MEAS - SV(RVOT): 68.9 ML
BH CV ECHO MEAS - TAPSE (>1.6): 2.22 CM
BH CV ECHO MEAS - TR MAX PG: 15.3 MMHG
BH CV ECHO MEAS - TR MAX VEL: 195.5 CM/SEC
BH CV ECHO MEASUREMENTS AVERAGE E/E' RATIO: 5.8
BH CV XLRA - RV BASE: 2.41 CM
BH CV XLRA - RV LENGTH: 8 CM
BH CV XLRA - RV MID: 2.24 CM
BH CV XLRA - TDI S': 14.5 CM/SEC
LEFT ATRIUM VOLUME INDEX: 15.1 ML/M2
SINUS: 3.2 CM
STJ: 2.8 CM

## 2023-11-14 PROCEDURE — 93306 TTE W/DOPPLER COMPLETE: CPT

## 2023-11-14 PROCEDURE — 93306 TTE W/DOPPLER COMPLETE: CPT | Performed by: INTERNAL MEDICINE

## 2023-11-14 NOTE — TELEPHONE ENCOUNTER
Please let her know that her echo was normal.  We will be in touch with results of the Zio patch when it becomes available.

## 2023-11-14 NOTE — TELEPHONE ENCOUNTER
Left VM of results/recommendations and to call back with any further questions or concerns, allowed by verbal release form.     April Stern RN  Triage MG

## 2023-11-20 ENCOUNTER — OFFICE VISIT (OUTPATIENT)
Dept: FAMILY MEDICINE CLINIC | Facility: CLINIC | Age: 43
End: 2023-11-20
Payer: COMMERCIAL

## 2023-11-20 VITALS
HEIGHT: 68 IN | OXYGEN SATURATION: 98 % | DIASTOLIC BLOOD PRESSURE: 84 MMHG | BODY MASS INDEX: 36.53 KG/M2 | TEMPERATURE: 97.3 F | SYSTOLIC BLOOD PRESSURE: 142 MMHG | WEIGHT: 241 LBS | HEART RATE: 73 BPM

## 2023-11-20 DIAGNOSIS — E66.9 OBESITY (BMI 35.0-39.9 WITHOUT COMORBIDITY): ICD-10-CM

## 2023-11-20 DIAGNOSIS — Z13.6 SCREENING FOR ISCHEMIC HEART DISEASE: ICD-10-CM

## 2023-11-20 DIAGNOSIS — E53.8 B12 DEFICIENCY: ICD-10-CM

## 2023-11-20 DIAGNOSIS — G43.809 OTHER MIGRAINE WITHOUT STATUS MIGRAINOSUS, NOT INTRACTABLE: ICD-10-CM

## 2023-11-20 DIAGNOSIS — E55.9 VITAMIN D DEFICIENCY: ICD-10-CM

## 2023-11-20 DIAGNOSIS — Z79.899 HIGH RISK MEDICATION USE: ICD-10-CM

## 2023-11-20 DIAGNOSIS — D50.9 IRON DEFICIENCY ANEMIA, UNSPECIFIED IRON DEFICIENCY ANEMIA TYPE: Primary | ICD-10-CM

## 2023-11-20 PROBLEM — M79.606 PAIN IN LOWER LIMB: Status: ACTIVE | Noted: 2019-07-25

## 2023-11-20 PROBLEM — D21.9 FIBROIDS: Status: ACTIVE | Noted: 2022-08-17

## 2023-11-20 PROBLEM — N92.0 MENORRHAGIA: Status: ACTIVE | Noted: 2022-08-17

## 2023-11-20 PROBLEM — J30.9 ALLERGIC RHINITIS: Status: ACTIVE | Noted: 2019-07-25

## 2023-11-20 PROBLEM — Z98.890 S/P MYOMECTOMY: Status: ACTIVE | Noted: 2022-08-17

## 2023-11-20 PROBLEM — M79.89 SWELLING OF LOWER LIMB: Status: ACTIVE | Noted: 2019-07-25

## 2023-11-20 PROBLEM — R60.9 EDEMA: Status: ACTIVE | Noted: 2019-07-25

## 2023-11-20 PROBLEM — R06.02 SHORTNESS OF BREATH: Status: ACTIVE | Noted: 2019-07-25

## 2023-11-20 PROBLEM — Z87.19 HISTORY OF GASTROESOPHAGEAL REFLUX (GERD): Status: ACTIVE | Noted: 2019-07-25

## 2023-11-20 PROBLEM — J45.909 ASTHMA: Status: ACTIVE | Noted: 2019-07-25

## 2023-11-20 PROBLEM — M76.61 TENDONITIS, ACHILLES, RIGHT: Status: ACTIVE | Noted: 2019-10-10

## 2023-11-20 LAB
25(OH)D3+25(OH)D2 SERPL-MCNC: 28.6 NG/ML (ref 30–100)
ALBUMIN SERPL-MCNC: 4.7 G/DL (ref 3.5–5.2)
ALBUMIN/GLOB SERPL: 1.7 G/DL
ALP SERPL-CCNC: 93 U/L (ref 39–117)
ALT SERPL-CCNC: 51 U/L (ref 1–33)
AST SERPL-CCNC: 30 U/L (ref 1–32)
BASOPHILS # BLD AUTO: 0.05 10*3/MM3 (ref 0–0.2)
BASOPHILS NFR BLD AUTO: 0.6 % (ref 0–1.5)
BILIRUB SERPL-MCNC: 0.6 MG/DL (ref 0–1.2)
BUN SERPL-MCNC: 14 MG/DL (ref 6–20)
BUN/CREAT SERPL: 21.9 (ref 7–25)
CALCIUM SERPL-MCNC: 9.6 MG/DL (ref 8.6–10.5)
CHLORIDE SERPL-SCNC: 104 MMOL/L (ref 98–107)
CHOLEST SERPL-MCNC: 211 MG/DL (ref 0–200)
CO2 SERPL-SCNC: 24.9 MMOL/L (ref 22–29)
CREAT SERPL-MCNC: 0.64 MG/DL (ref 0.57–1)
EGFRCR SERPLBLD CKD-EPI 2021: 111.9 ML/MIN/1.73
EOSINOPHIL # BLD AUTO: 0.26 10*3/MM3 (ref 0–0.4)
EOSINOPHIL NFR BLD AUTO: 3.3 % (ref 0.3–6.2)
ERYTHROCYTE [DISTWIDTH] IN BLOOD BY AUTOMATED COUNT: 12.3 % (ref 12.3–15.4)
FERRITIN SERPL-MCNC: 34.9 NG/ML (ref 13–150)
FOLATE SERPL-MCNC: 17 NG/ML (ref 4.78–24.2)
GLOBULIN SER CALC-MCNC: 2.7 GM/DL
GLUCOSE SERPL-MCNC: 100 MG/DL (ref 65–99)
HBA1C MFR BLD: 5.8 % (ref 4.8–5.6)
HCT VFR BLD AUTO: 39 % (ref 34–46.6)
HDLC SERPL-MCNC: 37 MG/DL (ref 40–60)
HGB BLD-MCNC: 13.2 G/DL (ref 12–15.9)
IMM GRANULOCYTES # BLD AUTO: 0.02 10*3/MM3 (ref 0–0.05)
IMM GRANULOCYTES NFR BLD AUTO: 0.3 % (ref 0–0.5)
IRON SATN MFR SERPL: 15 % (ref 20–50)
IRON SERPL-MCNC: 67 MCG/DL (ref 37–145)
LDLC SERPL CALC-MCNC: 131 MG/DL (ref 0–100)
LYMPHOCYTES # BLD AUTO: 2.15 10*3/MM3 (ref 0.7–3.1)
LYMPHOCYTES NFR BLD AUTO: 27.1 % (ref 19.6–45.3)
MAGNESIUM SERPL-MCNC: 2.2 MG/DL (ref 1.6–2.6)
MCH RBC QN AUTO: 29.3 PG (ref 26.6–33)
MCHC RBC AUTO-ENTMCNC: 33.8 G/DL (ref 31.5–35.7)
MCV RBC AUTO: 86.5 FL (ref 79–97)
MONOCYTES # BLD AUTO: 0.52 10*3/MM3 (ref 0.1–0.9)
MONOCYTES NFR BLD AUTO: 6.5 % (ref 5–12)
NEUTROPHILS # BLD AUTO: 4.94 10*3/MM3 (ref 1.7–7)
NEUTROPHILS NFR BLD AUTO: 62.2 % (ref 42.7–76)
NRBC BLD AUTO-RTO: 0 /100 WBC (ref 0–0.2)
PLATELET # BLD AUTO: 324 10*3/MM3 (ref 140–450)
POTASSIUM SERPL-SCNC: 4.5 MMOL/L (ref 3.5–5.2)
PROT SERPL-MCNC: 7.4 G/DL (ref 6–8.5)
RBC # BLD AUTO: 4.51 10*6/MM3 (ref 3.77–5.28)
SODIUM SERPL-SCNC: 139 MMOL/L (ref 136–145)
TIBC SERPL-MCNC: 435 MCG/DL
TRIGL SERPL-MCNC: 241 MG/DL (ref 0–150)
TSH SERPL DL<=0.005 MIU/L-ACNC: 1.73 UIU/ML (ref 0.27–4.2)
UIBC SERPL-MCNC: 368 MCG/DL (ref 112–346)
VIT B12 SERPL-MCNC: 786 PG/ML (ref 211–946)
VLDLC SERPL CALC-MCNC: 43 MG/DL (ref 5–40)
WBC # BLD AUTO: 7.94 10*3/MM3 (ref 3.4–10.8)

## 2023-11-20 PROCEDURE — 99204 OFFICE O/P NEW MOD 45 MIN: CPT | Performed by: STUDENT IN AN ORGANIZED HEALTH CARE EDUCATION/TRAINING PROGRAM

## 2023-11-20 RX ORDER — ZOLMITRIPTAN 2.5 MG/1
2.5 TABLET, FILM COATED ORAL CONTINUOUS PRN
Qty: 12 TABLET | Refills: 5 | Status: SHIPPED | OUTPATIENT
Start: 2023-11-20 | End: 2023-12-15

## 2023-11-20 NOTE — ASSESSMENT & PLAN NOTE
Patient's (Body mass index is 36.64 kg/m².)   Discussed BMR.  Since she is currently undergoing IVF therapy - on progesterone which can effect weight.. She may want to discuss with her fertility specialist whether weight loss is recommend during this time. I would not advise weight loss medications at this time as she is pursuing pregnancy.      Patient was counseled regarding a healthy diet high in whole grains, omega 3 fats, fruits and vegetables. She was counseled regarding recommendations for atleast 150minutes of moderate exercise such as walking weekly. Goal of atleast 500kcal reduction for weight loss.

## 2023-11-20 NOTE — PROGRESS NOTES
"Chief Complaint  Establish Care (Discuss weight gain and requested labs (fasting))    Subjective        Mel Calero presents to [facility]  History of Present Illness  43-year-old female with asthma, GERD who presents to establish care today.    Currently undergoing IVF with donor eggs.  She is perimenopausal and has been experiencing a lot of weight gain.  She does track her food -states she is averaging about 2000 eladia/day.  She naturally does intermittent fasting.  She does not drink alcohol.  She does drink coffee with creamer and sweetener but no other caloric beverages.  She does not like fruits or vegetables (except grilled).     She has a fear of flying and travels slightly more than weekly for her job.  She is an  for [employer].    Headaches are well controlled now that her menstrual cycles are irregular.  She does take Zomig for it.  She will initially try ibuprofen and Excedrin as she does have side effects from Zomig-fatigue sleepiness.    History of B12 and iron deficiency.  She states that iron supplementation previously caused her to gain a lot of weight.      Objective   Vital Signs:  /84 (BP Location: Right arm, Patient Position: Sitting, Cuff Size: Large Adult)   Pulse 73   Temp 97.3 °F (36.3 °C) (Infrared)   Ht 172.7 cm (68\")   Wt 109 kg (241 lb)   SpO2 98%   BMI 36.64 kg/m²   Estimated body mass index is 36.64 kg/m² as calculated from the following:    Height as of this encounter: 172.7 cm (68\").    Weight as of this encounter: 109 kg (241 lb).       Class 2 Severe Obesity (BMI >=35 and <=39.9). Obesity-related health conditions include the following: none. Obesity is unchanged. BMI is is above average; BMI management plan is completed. We discussed low calorie, low carb based diet program, portion control, increasing exercise, joining a fitness center or start home based exercise program, and an yunier-based approach such as [employer] Pal or Lose " It.      Physical Exam  Constitutional:       General: She is not in acute distress.  Eyes:      Conjunctiva/sclera: Conjunctivae normal.   Cardiovascular:      Rate and Rhythm: Normal rate and regular rhythm.   Pulmonary:      Effort: Pulmonary effort is normal. No respiratory distress.      Breath sounds: Normal breath sounds.   Abdominal:      Tenderness: There is no abdominal tenderness.   Skin:     General: Skin is warm and dry.   Neurological:      Mental Status: She is alert and oriented to person, place, and time.   Psychiatric:         Mood and Affect: Mood normal.         Behavior: Behavior normal.        Result Review :  The following data was reviewed by: Kayla Song MD on 11/20/2023:    Data reviewed : none             Assessment and Plan   Diagnoses and all orders for this visit:    1. Iron deficiency anemia, unspecified iron deficiency anemia type (Primary)  Assessment & Plan:  Previously on iron supplement but feels this caused weight gain so not currently on.   Hgb 10 in 2020.  Currently asymptomatic.  Recheck CBC with iron studies today.     Orders:  -     Iron and TIBC  -     Ferritin  -     CBC Auto Differential    2. Obesity (BMI 35.0-39.9 without comorbidity)  Assessment & Plan:  Patient's (Body mass index is 36.64 kg/m².)   Discussed BMR.  Since she is currently undergoing IVF therapy - on progesterone which can effect weight.. She may want to discuss with her fertility specialist whether weight loss is recommend during this time. I would not advise weight loss medications at this time as she is pursuing pregnancy.      Patient was counseled regarding a healthy diet high in whole grains, omega 3 fats, fruits and vegetables. She was counseled regarding recommendations for atleast 150minutes of moderate exercise such as walking weekly. Goal of atleast 500kcal reduction for weight loss.           3. B12 deficiency  Assessment & Plan:  Well controlled On B12 supplement.   Recheck  today.    Orders:  -     Vitamin B12  -     Folate  -     CBC Auto Differential    4. Vitamin D deficiency  -     Vitamin D,25-Hydroxy    5. Screening for ischemic heart disease  -     ORDER: Hemoglobin A1c  -     Comprehensive Metabolic Panel  -     TSH  -     Lipid Panel  -     Magnesium    6. High risk medication use  -     ToxASSURE Select 13 (MW) - Urine, Clean Catch    7. Other migraine without status migrainosus, not intractable  Comments:  well controlled.  Orders:  -     ZOLMitriptan (ZOMIG) 2.5 MG tablet; Take 1 tablet by mouth Continuous As Needed for Migraine for up to 25 days.  Dispense: 12 tablet; Refill: 5             Follow Up   No follow-ups on file.  Patient was given instructions and counseling regarding her condition or for health maintenance advice. Please see specific information pulled into the AVS if appropriate.

## 2023-11-20 NOTE — ASSESSMENT & PLAN NOTE
Previously on iron supplement but feels this caused weight gain so not currently on.   Hgb 10 in 2020.  Currently asymptomatic.  Recheck CBC with iron studies today.

## 2023-11-21 NOTE — PROGRESS NOTES
Please let her know lab results:    1.  Blood sugar (hemoglobin A1c) is mildly elevated at 5.8%.  This does put you in prediabetic range.  This can be controlled with lifestyle modifications.  I would recommend a low sugar, low-carb diet with increased exercise.    2.  Cholesterol is also high.  Your LDL cholesterol is 131.  Goal is less than 100.  Your overall cardiovascular risk is low. I would encourage you to eat a healthy diet rich in fruits and vegetables, whole grains, and lean proteins. Limit carbohydrates and saturated fats as these can elevate your cholesterol. Exercise regularly.    3.  Vitamin D is very mildly low.  You may want to consider an over-the-counter supplement of 2000 international units daily.    All other labs are normal.  Recommend repeating on a yearly basis.    The 10-year ASCVD risk score (Patricia LANDEROS, et al., 2019) is: 1.9%    Values used to calculate the score:      Age: 43 years      Sex: Female      Is Non- : No      Diabetic: No      Tobacco smoker: No      Systolic Blood Pressure: 142 mmHg      Is BP treated: No      HDL Cholesterol: 37 mg/dL      Total Cholesterol: 211 mg/dL

## 2023-11-25 LAB — DRUGS UR: NORMAL

## 2023-11-28 DIAGNOSIS — F40.243 FEAR OF FLYING: Primary | ICD-10-CM

## 2023-11-28 RX ORDER — ALPRAZOLAM 0.5 MG/1
0.5 TABLET ORAL 2 TIMES DAILY PRN
Qty: 12 TABLET | Refills: 0 | Status: SHIPPED | OUTPATIENT
Start: 2023-11-28

## 2023-11-30 ENCOUNTER — PATIENT ROUNDING (BHMG ONLY) (OUTPATIENT)
Dept: FAMILY MEDICINE CLINIC | Facility: CLINIC | Age: 43
End: 2023-11-30
Payer: COMMERCIAL

## 2023-11-30 NOTE — PROGRESS NOTES
November 30, 2023    Hello, may I speak with Mel Calero?    My name is Tiffanie Verdugo      I am  with [facility]  [address] [place] [id number] [phone].    Before we get started may I verify your date of birth? 1980    I am calling to officially welcome you to our practice and ask about your recent visit. Is this a good time to talk? No, my chart message sent

## 2023-12-11 ENCOUNTER — TELEPHONE (OUTPATIENT)
Dept: CARDIOLOGY | Facility: CLINIC | Age: 43
End: 2023-12-11

## 2023-12-11 NOTE — TELEPHONE ENCOUNTER
Notified patient of results/recommendations. Patient verbalized understanding. She is ok with not being on any medications at this time.     April Stern RN  Triage [place]

## 2023-12-11 NOTE — TELEPHONE ENCOUNTER
Please let her know that the Zio patch was benign.  When she was feeling symptoms it was with an isolated PAC, PVC or short bursts of SVT lasting less than 4 beats.  All in all this is benign and not dangerous.  She should try to avoid caffeine or other stimulants.  If it is really bothering her, we could consider a medication such as a beta-blocker but if she can live with it and be reassured that there is nothing dangerous going on that would be the best option.

## 2024-03-26 ENCOUNTER — OFFICE VISIT (OUTPATIENT)
Dept: SURGERY | Facility: CLINIC | Age: 44
End: 2024-03-26
Payer: COMMERCIAL

## 2024-03-26 VITALS
WEIGHT: 230.3 LBS | HEART RATE: 84 BPM | BODY MASS INDEX: 36.15 KG/M2 | HEIGHT: 67 IN | OXYGEN SATURATION: 98 % | SYSTOLIC BLOOD PRESSURE: 130 MMHG | DIASTOLIC BLOOD PRESSURE: 84 MMHG

## 2024-03-26 DIAGNOSIS — R19.5 POSITIVE FECAL OCCULT BLOOD TEST: Primary | ICD-10-CM

## 2024-03-26 DIAGNOSIS — K60.2 ANAL FISSURE: ICD-10-CM

## 2024-03-26 DIAGNOSIS — K59.04 CHRONIC IDIOPATHIC CONSTIPATION: ICD-10-CM

## 2024-03-26 DIAGNOSIS — Z83.719 FAMILY HISTORY OF COLONIC POLYPS: ICD-10-CM

## 2024-03-26 PROCEDURE — 99204 OFFICE O/P NEW MOD 45 MIN: CPT | Performed by: PHYSICIAN ASSISTANT

## 2024-03-26 PROCEDURE — 46600 DIAGNOSTIC ANOSCOPY SPX: CPT | Performed by: PHYSICIAN ASSISTANT

## 2024-03-26 RX ORDER — BLOOD PRESSURE TEST KIT
KIT MISCELLANEOUS SEE ADMIN INSTRUCTIONS
COMMUNITY
Start: 2024-02-29

## 2024-03-26 RX ORDER — FLUTICASONE PROPIONATE AND SALMETEROL 100; 50 UG/1; UG/1
1 POWDER RESPIRATORY (INHALATION) DAILY
COMMUNITY
Start: 2024-03-08

## 2024-03-26 RX ORDER — PROGESTERONE 50 MG/ML
INJECTION, SOLUTION INTRAMUSCULAR
COMMUNITY
Start: 2024-01-31

## 2024-03-26 RX ORDER — CONTAINER,EMPTY
EACH MISCELLANEOUS
COMMUNITY
Start: 2024-02-29

## 2024-03-26 RX ORDER — CLOTRIMAZOLE AND BETAMETHASONE DIPROPIONATE 10; .64 MG/G; MG/G
CREAM TOPICAL
COMMUNITY
Start: 2024-01-31

## 2024-03-26 RX ORDER — SYRINGE-NEEDLE,INSULIN,0.5 ML 28GX1/2"
SYRINGE, EMPTY DISPOSABLE MISCELLANEOUS
COMMUNITY
Start: 2024-02-29

## 2024-03-26 RX ORDER — NEEDLES, FILTER 19GX1 1/2"
NEEDLE, DISPOSABLE MISCELLANEOUS
COMMUNITY
Start: 2024-01-31 | End: 2024-03-26 | Stop reason: SDUPTHER

## 2024-03-26 RX ORDER — ESTRADIOL 2 MG/1
2 TABLET ORAL 3 TIMES DAILY
COMMUNITY
Start: 2024-01-31

## 2024-03-26 RX ORDER — AZITHROMYCIN 250 MG/1
TABLET, FILM COATED ORAL
COMMUNITY
Start: 2024-01-31 | End: 2024-03-26

## 2024-03-26 RX ORDER — SODIUM CHLORIDE, SODIUM LACTATE, POTASSIUM CHLORIDE, CALCIUM CHLORIDE 600; 310; 30; 20 MG/100ML; MG/100ML; MG/100ML; MG/100ML
30 INJECTION, SOLUTION INTRAVENOUS CONTINUOUS
OUTPATIENT
Start: 2024-03-26

## 2024-03-26 RX ORDER — LEUPROLIDE ACETATE 1 MG/0.2ML
KIT SUBCUTANEOUS
COMMUNITY
Start: 2024-02-29

## 2024-03-26 RX ORDER — BUDESONIDE AND FORMOTEROL FUMARATE DIHYDRATE 160; 4.5 UG/1; UG/1
2 AEROSOL RESPIRATORY (INHALATION) 2 TIMES DAILY
COMMUNITY
Start: 2023-12-27

## 2024-03-26 RX ORDER — NEEDLES, DISPOSABLE 25GX5/8"
NEEDLE, DISPOSABLE MISCELLANEOUS
COMMUNITY
Start: 2024-02-16

## 2024-03-26 RX ORDER — PROGESTERONE 200 MG/1
CAPSULE ORAL
COMMUNITY
Start: 2024-01-31

## 2024-03-26 RX ORDER — OLOPATADINE HYDROCHLORIDE AND MOMETASONE FUROATE 25; 665 UG/1; UG/1
SPRAY, METERED NASAL
COMMUNITY

## 2024-03-26 NOTE — PROGRESS NOTES
Mel Calero is a 43 y.o. female who is seen as a self-referred consult for positive fecal occult blood test.      HPI:  The patient states that she has struggled with constipation and anal tearing since she was in elementary school.  She has 2 out of 10 severity rectal discomfort, as well as associated rectal bleeding.  She states that she has not noticed any blood mixed with her stool.  She usually has a bowel movement once per week; more often if she has any amount of alcohol to drink.  Stool consistency varies widely, but is more often between [place] 1 and 3.  She states that she is trying to drink a lot of liquid IV lately, which she believes has helped somewhat.  She states that she has never talked to a healthcare provider about any of the symptoms.  She believes she may have IBS, as anxiety and certain foods can trigger fecal urgency.  She admits to a great deal of straining with bowel movements.  She is not currently taking any fiber supplements or stool softeners.  A pre and probiotic has helped.  She is not currently using any topical treatments.    Positive family history of colon polyps mother, who accompanies her today.  Her mother is also had diverticulitis.  The patient has never had a colonoscopy.    Past Medical History:   Diagnosis Date    Anemia 02/2019    Was treated with iron supplements at the time; now only B12    Anxiety     Asthma     Eczema     Esophageal stricture     Fissure, anal     Since childhood    GERD (gastroesophageal reflux disease)     History of uterine fibroid     Migraines     Psoriasis of scalp     Rectal bleeding     Seasonal allergies     Urethral cyst        Past Surgical History:   Procedure Laterality Date    CYSTOSCOPY URETHRAL DIVERTICULUM REPAIR/EXCISION N/A 11/04/2020    Procedure: EXCISION OF URETHRAL CYST AND CYSTOSCOPY;  Surgeon: Shoaib Baxter MD;  Location: [facility] OR;  Service: Urology;  Laterality: N/A;    ENDOSCOPY      ESOPHAGEAL  "DILATATION      EYE SURGERY  2012    Retina buckle    MYOMECTOMY  2012    x2 (davinci in 2012 and open in 8/2022)    RETINAL DETACHMENT REPAIR  2006       Social History:   reports that she has been smoking cigarettes. She has been exposed to tobacco smoke. She has never used smokeless tobacco. She reports current alcohol use. She reports that she does not use drugs.      Marriage status:     Family History   Problem Relation Age of Onset    Heart disease Father     Stroke Father     Diabetes Father     Diabetes Sister     Hypertension Sister     Diabetes Paternal Uncle     Diabetes Paternal Uncle     Congenital heart disease Maternal Grandmother     Heart disease Maternal Grandmother         Congenital heart failure, pacemaker reliant    Heart disease Maternal Grandfather         smoker    Stroke Maternal Grandfather     Aneurysm Maternal Grandfather     Malig Hyperthermia Neg Hx          Current Outpatient Medications:     albuterol sulfate HFA (PROAIR HFA) 108 (90 Base) MCG/ACT inhaler, Inhale 2 puffs 2 (Two) Times a Day As Needed for Shortness of Air or Wheezing., Disp: , Rfl:     Alcohol Swabs pads, See Admin Instructions., Disp: , Rfl:     ALPRAZolam (Xanax) 0.5 MG tablet, Take 1 tablet by mouth 2 (Two) Times a Day As Needed (fear of flying)., Disp: 12 tablet, Rfl: 0    B Complex Vitamins (vitamin b complex) capsule capsule, Take 1 capsule by mouth Daily., Disp: , Rfl:     Barberry-Oreg Grape-Goldenseal (BERBERINE COMPLEX PO), Take  by mouth., Disp: , Rfl:     BD Disp Needles 22G X 1-1/2\" misc, USE ONE NEEDLE IN THE MUSCLE EVERY 3 DAYS USE NEEDLE TO INJECT JORDY, Disp: , Rfl:     cetirizine (zyrTEC) 5 MG tablet, Take 1 tablet by mouth Daily., Disp: , Rfl:     clotrimazole-betamethasone (LOTRISONE) 1-0.05 % cream, , Disp: , Rfl:     estradiol (ESTRACE) 2 MG tablet, Take 1 tablet by mouth 3 (Three) Times a Day., Disp: , Rfl:     fexofenadine-pseudoephedrine (ALLEGRA-D 24) 180-240 MG per 24 hr tablet, Take 1 " "tablet by mouth Daily As Needed., Disp: , Rfl:     lansoprazole (PREVACID) 30 MG capsule, Take 1 capsule by mouth Daily., Disp: , Rfl:     Leuprolide Acetate 1 MG/0.2ML kit, INJECT 1/2MG SUBCUTANEOUSLY ONCE DAILY AS DIRECTED 0.5MG=10 UNITS, Disp: , Rfl:     melatonin 1 MG tablet, At Night As Needed., Disp: , Rfl:     Olopatadine-Mometasone (Ryaltris) 665-25 MCG/ACT suspension, into the nostril(s) as directed by provider., Disp: , Rfl:     Prenatal Vit-Fe Fumarate-FA (prenatal vitamin 27-0.8) 27-0.8 MG tablet tablet, Take  by mouth Daily., Disp: , Rfl:     Progesterone (PROMETRIUM) 200 MG capsule, INSERT ONE CAPSULE VAGINALLY THREE TIMES DAILY AS DIRECTED, Disp: , Rfl:     progesterone oil 50 MG/ML injection, INJECT 1 ML IN THE MUSCLE EVERY 3 DAYS AS DIRECTED TO DRAW UP AND SWITCH NEEDLES TO INJECT, Disp: , Rfl:     sharps container, USE TO DISPOSE OF NEEDLES, Disp: , Rfl:     SURE COMFORT INS SYR .5CC/28G 28G X 1/2\" 0.5 ML misc, USE AS DIRECTED [LEUPROLIDE MEDICATION], Disp: , Rfl:     Symbicort 160-4.5 MCG/ACT inhaler, Inhale 2 puffs 2 (Two) Times a Day. Rinse mouth after each use, Disp: , Rfl:     Unable to find, Fed-Ex Saturday    Dispense date: 02/29/2024  Quantity: 1 each  Days supply: 1  Patient sig: FRI-SAT PT HOME NO SIG DECLINED COUNSELING CC, Disp: , Rfl:     vitamin B-12 (CYANOCOBALAMIN) 1000 MCG tablet, Take 1 tablet by mouth Daily., Disp: , Rfl:     Wixela Inhub 100-50 MCG/ACT DISKUS, Inhale 1 puff Daily., Disp: , Rfl:     ZOLMitriptan (ZOMIG) 2.5 MG tablet, Take 1 tablet by mouth Continuous As Needed for Migraine for up to 25 days., Disp: 12 tablet, Rfl: 5    Allergy  Amoxicillin and Contrast dye (echo or unknown ct/mr)    Vitals:    03/26/24 0916   BP: 130/84   Pulse: 84   SpO2: 98%   -  Body mass index is 36.07 kg/m².    Physical Exam  No acute distress  Chaperone present  Perianal exam: small anterior midline fissure with scant active bleeding. external hemorrhoids normal. MARISOL: no masses. Anoscopy " performed: Grade 1-2 x 3 internal hemorrhoids    Review of Medical Record:  I reviewed records as noted in HPI.    Assessment:  1. Positive fecal occult blood test    2. Family history of colonic polyps    3. Chronic idiopathic constipation      Plan:  Schedule colonoscopy 4/10/24, before her mid-May IUI treatment  I advised the patient to start a fiber supplement for a daily fiber intake total of 30 to 40 g daily.  I encouraged adequate water intake, as well as MiraLAX as needed to keep stools soft. I provided a prescription for diltiazem/lidocaine compound cream for the anal fissure.  Encouraged patient to verify with obstetrician that they are ok with patient continuing compound cream if she becomes pregnant.  Follow up 4-6 weeks after colonoscopy    India Armas PA-C  Physician Assistant  Colorectal Surgery

## 2024-04-10 ENCOUNTER — ANESTHESIA EVENT (OUTPATIENT)
Dept: GASTROENTEROLOGY | Facility: HOSPITAL | Age: 44
End: 2024-04-10
Payer: COMMERCIAL

## 2024-04-10 ENCOUNTER — ANESTHESIA (OUTPATIENT)
Dept: GASTROENTEROLOGY | Facility: HOSPITAL | Age: 44
End: 2024-04-10
Payer: COMMERCIAL

## 2024-04-10 ENCOUNTER — HOSPITAL ENCOUNTER (OUTPATIENT)
Facility: HOSPITAL | Age: 44
Setting detail: HOSPITAL OUTPATIENT SURGERY
Discharge: HOME | End: 2024-04-10
Attending: COLON & RECTAL SURGERY | Admitting: COLON & RECTAL SURGERY
Payer: COMMERCIAL

## 2024-04-10 VITALS
HEART RATE: 65 BPM | DIASTOLIC BLOOD PRESSURE: 91 MMHG | WEIGHT: 231.1 LBS | HEIGHT: 68 IN | RESPIRATION RATE: 16 BRPM | BODY MASS INDEX: 35.03 KG/M2 | SYSTOLIC BLOOD PRESSURE: 156 MMHG | OXYGEN SATURATION: 99 %

## 2024-04-10 DIAGNOSIS — R19.5 POSITIVE FECAL OCCULT BLOOD TEST: ICD-10-CM

## 2024-04-10 DIAGNOSIS — K59.04 CHRONIC IDIOPATHIC CONSTIPATION: ICD-10-CM

## 2024-04-10 DIAGNOSIS — Z83.719 FAMILY HISTORY OF COLONIC POLYPS: ICD-10-CM

## 2024-04-10 LAB
B-HCG UR QL: NEGATIVE
EXPIRATION DATE: NORMAL
INTERNAL NEGATIVE CONTROL: NEGATIVE
INTERNAL POSITIVE CONTROL: POSITIVE
Lab: NORMAL

## 2024-04-10 PROCEDURE — 45378 DIAGNOSTIC COLONOSCOPY: CPT | Performed by: COLON & RECTAL SURGERY

## 2024-04-10 PROCEDURE — 25010000002 PROPOFOL 10 MG/ML EMULSION: Performed by: NURSE ANESTHETIST, CERTIFIED REGISTERED

## 2024-04-10 PROCEDURE — 25810000003 LACTATED RINGERS PER 1000 ML: Performed by: NURSE ANESTHETIST, CERTIFIED REGISTERED

## 2024-04-10 PROCEDURE — 81025 URINE PREGNANCY TEST: CPT | Performed by: PHYSICIAN ASSISTANT

## 2024-04-10 PROCEDURE — 25810000003 LACTATED RINGERS PER 1000 ML: Performed by: PHYSICIAN ASSISTANT

## 2024-04-10 RX ORDER — SODIUM CHLORIDE, SODIUM LACTATE, POTASSIUM CHLORIDE, CALCIUM CHLORIDE 600; 310; 30; 20 MG/100ML; MG/100ML; MG/100ML; MG/100ML
INJECTION, SOLUTION INTRAVENOUS CONTINUOUS PRN
Status: DISCONTINUED | OUTPATIENT
Start: 2024-04-10 | End: 2024-04-10 | Stop reason: SURG

## 2024-04-10 RX ORDER — SODIUM CHLORIDE, SODIUM LACTATE, POTASSIUM CHLORIDE, CALCIUM CHLORIDE 600; 310; 30; 20 MG/100ML; MG/100ML; MG/100ML; MG/100ML
30 INJECTION, SOLUTION INTRAVENOUS CONTINUOUS PRN
Status: DISCONTINUED | OUTPATIENT
Start: 2024-04-10 | End: 2024-04-10 | Stop reason: HOSPADM

## 2024-04-10 RX ORDER — PROPOFOL 10 MG/ML
VIAL (ML) INTRAVENOUS AS NEEDED
Status: DISCONTINUED | OUTPATIENT
Start: 2024-04-10 | End: 2024-04-10 | Stop reason: SURG

## 2024-04-10 RX ORDER — SODIUM CHLORIDE, SODIUM LACTATE, POTASSIUM CHLORIDE, CALCIUM CHLORIDE 600; 310; 30; 20 MG/100ML; MG/100ML; MG/100ML; MG/100ML
30 INJECTION, SOLUTION INTRAVENOUS CONTINUOUS
Status: DISCONTINUED | OUTPATIENT
Start: 2024-04-10 | End: 2024-04-10 | Stop reason: HOSPADM

## 2024-04-10 RX ORDER — LIDOCAINE HYDROCHLORIDE 20 MG/ML
INJECTION, SOLUTION INFILTRATION; PERINEURAL AS NEEDED
Status: DISCONTINUED | OUTPATIENT
Start: 2024-04-10 | End: 2024-04-10 | Stop reason: SURG

## 2024-04-10 RX ADMIN — SODIUM CHLORIDE, POTASSIUM CHLORIDE, SODIUM LACTATE AND CALCIUM CHLORIDE: 600; 310; 30; 20 INJECTION, SOLUTION INTRAVENOUS at 12:01

## 2024-04-10 RX ADMIN — LIDOCAINE HYDROCHLORIDE 60 MG: 20 INJECTION, SOLUTION INFILTRATION; PERINEURAL at 12:05

## 2024-04-10 RX ADMIN — SODIUM CHLORIDE, POTASSIUM CHLORIDE, SODIUM LACTATE AND CALCIUM CHLORIDE 30 ML/HR: 600; 310; 30; 20 INJECTION, SOLUTION INTRAVENOUS at 11:30

## 2024-04-10 RX ADMIN — PROPOFOL 100 MG: 10 INJECTION, EMULSION INTRAVENOUS at 12:05

## 2024-04-10 RX ADMIN — PROPOFOL 250 MCG/KG/MIN: 10 INJECTION, EMULSION INTRAVENOUS at 12:06

## 2024-04-10 NOTE — DISCHARGE INSTRUCTIONS
For the next 24 hours patient needs to be with a responsible adult.    For 24 hours DO NOT drive, operate machinery, appliances, drink alcohol, make important decisions or sign legal documents.    Start with a light or bland diet if you are feeling sick to your stomach otherwise advance to regular diet as tolerated.    Follow recommendations on procedure report if provided by your doctor.    Call Dr Montenegro for problems [phone].  Increase fiber intake or take fiber supplements and stool softener as advised by Dr. Montenegro    Problems may include but not limited to: large amounts of bleeding, trouble breathing, repeated vomiting, severe unrelieved pain, fever or chills.

## 2024-04-10 NOTE — H&P
Mel Calero is a 44 y.o. female  who is referred by Giuseppe Montenegro MD for a colonoscopy. She   has an indications: +FOBT.     She denies any change in bowel function, melena, or hematochezia.    Past Medical History:   Diagnosis Date    Anemia 02/2019    Was treated with iron supplements at the time; now only B12    Anxiety     Asthma     Eczema     Esophageal stricture     Fissure, anal     Since childhood    GERD (gastroesophageal reflux disease)     History of uterine fibroid     Migraines     Psoriasis of scalp     Rectal bleeding     Seasonal allergies     Urethral cyst        Past Surgical History:   Procedure Laterality Date    CYSTOSCOPY URETHRAL DIVERTICULUM REPAIR/EXCISION N/A 11/04/2020    Procedure: EXCISION OF URETHRAL CYST AND CYSTOSCOPY;  Surgeon: Shoaib Baxter MD;  Location: [facility] OR;  Service: Urology;  Laterality: N/A;    ENDOSCOPY      ESOPHAGEAL DILATATION      EYE SURGERY  2012    Retina buckle    MYOMECTOMY  2012    x2 (davinci in 2012 and open in 8/2022)    RETINAL DETACHMENT REPAIR  2006       Medications Prior to Admission   Medication Sig Dispense Refill Last Dose    B Complex Vitamins (vitamin b complex) capsule capsule Take 1 capsule by mouth Daily.   4/9/2024    Barberry-Oreg Grape-Goldenseal (BERBERINE COMPLEX PO) Take  by mouth.   4/9/2024    cetirizine (zyrTEC) 5 MG tablet Take 1 tablet by mouth Daily.   4/9/2024    lansoprazole (PREVACID) 30 MG capsule Take 1 capsule by mouth Daily.   Past Week    Leuprolide Acetate 1 MG/0.2ML kit INJECT 1/2MG SUBCUTANEOUSLY ONCE DAILY AS DIRECTED 0.5MG=10 UNITS   4/9/2024    melatonin 1 MG tablet At Night As Needed.   Past Week    Olopatadine-Mometasone (Ryaltris) 665-25 MCG/ACT suspension into the nostril(s) as directed by provider.   4/9/2024    Prenatal Vit-Fe Fumarate-FA (prenatal vitamin 27-0.8) 27-0.8 MG tablet tablet Take  by mouth Daily.   4/9/2024    vitamin B-12 (CYANOCOBALAMIN) 1000 MCG tablet Take 1 tablet by mouth  "Daily.   4/9/2024    Wixela Inhub 100-50 MCG/ACT DISKUS Inhale 1 puff Daily.   4/9/2024    ZOLMitriptan (ZOMIG) 2.5 MG tablet Take 1 tablet by mouth Continuous As Needed for Migraine for up to 25 days. 12 tablet 5 Past Week    albuterol sulfate HFA (PROAIR HFA) 108 (90 Base) MCG/ACT inhaler Inhale 2 puffs 2 (Two) Times a Day As Needed for Shortness of Air or Wheezing.   More than a month    Alcohol Swabs pads See Admin Instructions.       ALPRAZolam (Xanax) 0.5 MG tablet Take 1 tablet by mouth 2 (Two) Times a Day As Needed (fear of flying). 12 tablet 0 More than a month    BD Disp Needles 22G X 1-1/2\" misc USE ONE NEEDLE IN THE MUSCLE EVERY 3 DAYS USE NEEDLE TO INJECT JORDY       clotrimazole-betamethasone (LOTRISONE) 1-0.05 % cream        estradiol (ESTRACE) 2 MG tablet Take 1 tablet by mouth 3 (Three) Times a Day.   More than a month    fexofenadine-pseudoephedrine (ALLEGRA-D 24) 180-240 MG per 24 hr tablet Take 1 tablet by mouth Daily As Needed.   More than a month    Progesterone (PROMETRIUM) 200 MG capsule INSERT ONE CAPSULE VAGINALLY THREE TIMES DAILY AS DIRECTED   More than a month    progesterone oil 50 MG/ML injection INJECT 1 ML IN THE MUSCLE EVERY 3 DAYS AS DIRECTED TO DRAW UP AND SWITCH NEEDLES TO INJECT   More than a month    sharps container USE TO DISPOSE OF NEEDLES       SURE COMFORT INS SYR .5CC/28G 28G X 1/2\" 0.5 ML misc USE AS DIRECTED [LEUPROLIDE MEDICATION]       Symbicort 160-4.5 MCG/ACT inhaler Inhale 2 puffs 2 (Two) Times a Day. Rinse mouth after each use       Unable to find Fed-Ex Saturday     Dispense date: 02/29/2024   Quantity: 1 each   Days supply: 1   Patient sig: FRI-SAT PT HOME NO SIG DECLINED COUNSELING CC          Allergies   Allergen Reactions    Amoxicillin Nausea And Vomiting, Hallucinations and Headache    Contrast Dye (Echo Or Unknown Ct/Mr) Hives       Family History   Problem Relation Age of Onset    Heart disease Father     Stroke Father     Diabetes Father     Diabetes " Sister     Hypertension Sister     Diabetes Paternal Uncle     Diabetes Paternal Uncle     Congenital heart disease Maternal Grandmother     Heart disease Maternal Grandmother         Congenital heart failure, pacemaker reliant    Heart disease Maternal Grandfather         smoker    Stroke Maternal Grandfather     Aneurysm Maternal Grandfather     Malig Hyperthermia Neg Hx        Social History     Socioeconomic History    Marital status:    Tobacco Use    Smoking status: Some Days     Types: Cigarettes     Passive exposure: Current    Smokeless tobacco: Never    Tobacco comments:     Very minimal, social smoking only   Vaping Use    Vaping status: Never Used   Substance and Sexual Activity    Alcohol use: Yes     Comment: Do not average 1 per week    Drug use: Never    Sexual activity: Yes     Partners: Male     Comment: Undergoing fertility treatment; IVF tentatively in May       Review of Systems   Gastrointestinal:  Negative for abdominal pain, nausea and vomiting.   All other systems reviewed and are negative.      Vitals:    04/10/24 1116   BP: 131/82   Pulse: 70   Resp: 16   SpO2: 98%         Physical Exam  Constitutional:       Appearance: She is well-developed.   HENT:      Head: Normocephalic and atraumatic.   Eyes:      Pupils: Pupils are equal, round, and reactive to light.   Cardiovascular:      Rate and Rhythm: Regular rhythm.   Pulmonary:      Effort: Pulmonary effort is normal.   Abdominal:      General: There is no distension.      Palpations: Abdomen is soft.   Musculoskeletal:         General: Normal range of motion.   Skin:     General: Skin is warm and dry.   Neurological:      Mental Status: She is alert and oriented to person, place, and time.   Psychiatric:         Thought Content: Thought content normal.         Judgment: Judgment normal.           Assessment & Plan      indications: +FOBT         I recommend colonoscopy.  I described risks, benefits of the procedure with the patient  including but not limited to bleeding, infection, possibility of perforation and possible polypectomy. All of the patient's questions were answered and they would like to proceed with the above recommendations.

## 2024-04-10 NOTE — ANESTHESIA PREPROCEDURE EVALUATION
Anesthesia Evaluation     Patient summary reviewed and Nursing notes reviewed   NPO Solid Status: > 8 hours  NPO Liquid Status: > 2 hours           Airway   Mallampati: II  TM distance: >3 FB  Neck ROM: full  No difficulty expected  Dental - normal exam     Pulmonary - normal exam   (+) a smoker Current, asthma,shortness of breath  Cardiovascular - normal exam    ECG reviewed      ROS comment: EF 67% by ECHO 11/23    Neuro/Psych  (+) headaches, psychiatric history    ROS Comment: Migraines  GI/Hepatic/Renal/Endo    (+) obesity, GERD, GI bleeding lower active bleeding    Musculoskeletal     Abdominal   (+) obese   Substance History      OB/GYN          Other                      Anesthesia Plan    ASA 2     MAC     intravenous induction     Anesthetic plan, risks, benefits, and alternatives have been provided, discussed and informed consent has been obtained with: patient.    CODE STATUS:

## 2024-04-10 NOTE — ANESTHESIA POSTPROCEDURE EVALUATION
"Patient: Mel Calero    Procedure Summary       Date: 04/10/24 Room / Location: [facility] ENDOSCOPY 10 /  [facility]    Anesthesia Start: 1202 Anesthesia Stop: 1228    Procedure: COLONOSCOPY TO CECUM Diagnosis:       Positive fecal occult blood test      Family history of colonic polyps      Chronic idiopathic constipation      (Positive fecal occult blood test [R19.5])      (Family history of colonic polyps [Z83.719])      (Chronic idiopathic constipation [K59.04])    Surgeons: Giuseppe Montenegro MD Provider: Ignacio Granados MD    Anesthesia Type: MAC ASA Status: 2            Anesthesia Type: MAC    Vitals  Vitals Value Taken Time   /93 04/10/24 1237   Temp     Pulse 63 04/10/24 1245   Resp 16 04/10/24 1236   SpO2 97 % 04/10/24 1245   Vitals shown include unfiled device data.        Post Anesthesia Care and Evaluation    Patient location during evaluation: bedside  Patient participation: complete - patient participated  Level of consciousness: awake and alert  Pain management: adequate    Airway patency: patent  Anesthetic complications: No anesthetic complications    Cardiovascular status: acceptable  Respiratory status: acceptable  Hydration status: acceptable    Comments: /93 (BP Location: Left arm, Patient Position: Lying)   Pulse 63   Resp 16   Ht 172.7 cm (68\")   Wt 105 kg (231 lb 1.6 oz)   LMP  (LMP Unknown)   SpO2 96%   BMI 35.14 kg/m²     "

## 2024-07-30 ENCOUNTER — OFFICE VISIT (OUTPATIENT)
Dept: FAMILY MEDICINE CLINIC | Facility: CLINIC | Age: 44
End: 2024-07-30
Payer: COMMERCIAL

## 2024-07-30 VITALS
BODY MASS INDEX: 35.61 KG/M2 | WEIGHT: 235 LBS | TEMPERATURE: 97 F | OXYGEN SATURATION: 98 % | HEART RATE: 74 BPM | HEIGHT: 68 IN | SYSTOLIC BLOOD PRESSURE: 138 MMHG | DIASTOLIC BLOOD PRESSURE: 82 MMHG

## 2024-07-30 DIAGNOSIS — E66.9 OBESITY (BMI 35.0-39.9 WITHOUT COMORBIDITY): Primary | ICD-10-CM

## 2024-07-30 PROCEDURE — 99213 OFFICE O/P EST LOW 20 MIN: CPT | Performed by: STUDENT IN AN ORGANIZED HEALTH CARE EDUCATION/TRAINING PROGRAM

## 2024-07-30 NOTE — PROGRESS NOTES
"Chief Complaint  Weight Gain    Subjective        Mel Calero presents to [facility] PRIMARY [facility]ARE  History of Present Illness  43-year-old female with asthma, GERD who presents to discuss obesity.    For the last 6 months she has been adhering to a strict calorie reduction and increased exercise.  She initially lost 15 pounds with lifestyle intervention but since that time weight has been fluctuating.  She was previously undergoing IVF treatments for infertility and noticed that it was easier for her to lose weight when she was doing these treatments.  Her and her  are no longer pursuing IVF.    No history of thyroid cancer or pancreatitis.  She does have mild GERD for which she takes medication.  She also has mild constipation which is adequately managed.  She is not on any other weight loss medications.      Objective   Vital Signs:  /82   Pulse 74   Temp 97 °F (36.1 °C)   Ht 172.7 cm (68\")   Wt 107 kg (235 lb)   SpO2 98%   BMI 35.73 kg/m²   Estimated body mass index is 35.73 kg/m² as calculated from the following:    Height as of this encounter: 172.7 cm (68\").    Weight as of this encounter: 107 kg (235 lb).               Physical Exam  Constitutional:       General: She is not in acute distress.  Eyes:      Conjunctiva/sclera: Conjunctivae normal.   Cardiovascular:      Rate and Rhythm: Normal rate and regular rhythm.   Pulmonary:      Effort: Pulmonary effort is normal. No respiratory distress.      Breath sounds: Normal breath sounds.   Abdominal:      Palpations: Abdomen is soft.      Tenderness: There is no abdominal tenderness.   Skin:     General: Skin is warm and dry.   Neurological:      Mental Status: She is alert and oriented to person, place, and time.   Psychiatric:         Mood and Affect: Mood normal.         Behavior: Behavior normal.        Result Review :    The following data was reviewed by: Kayla Song MD on 07/30/2024:  Common labs          " 11/20/2023    09:36   Common Labs   Glucose 100    BUN 14    Creatinine 0.64    Sodium 139    Potassium 4.5    Chloride 104    Calcium 9.6    Total Protein 7.4    Albumin 4.7    Total Bilirubin 0.6    Alkaline Phosphatase 93    AST (SGOT) 30    ALT (SGPT) 51    WBC 7.94    Hemoglobin 13.2    Hematocrit 39.0    Platelets 324    Total Cholesterol 211    Triglycerides 241    HDL Cholesterol 37    LDL Cholesterol  131    Hemoglobin A1C 5.80      Data reviewed : None             Assessment and Plan     Diagnoses and all orders for this visit:    1. Obesity (BMI 35.0-39.9 without comorbidity) (Primary)  -     Semaglutide-Weight Management 0.25 MG/0.5ML solution auto-injector; Inject 0.5 mL under the skin into the appropriate area as directed 1 (One) Time Per Week.  Dispense: 2 mL; Refill: 0      Weight loss options discussed including GLP1 agonists. Risks and benefits of each were discussed in detail. Patient does not have personal history of pancreatitis, or thyroid cancer. No FHx of thyroid cancer. Discussed that these medications are to be used in conjunction with diet and exercise.  She has pursued greater than 6 months of lifestyle intervention which initially worked but has since plateaued.  She is not using any other weight loss medications in conjunction.      Patient was counseled regarding a healthy diet high in whole grains, omega 3 fats, fruits and vegetables. She was counseled regarding recommendations for atleast 150minutes of moderate exercise such as walking weekly. Goal of atleast 500kcal reduction for weight loss.          Follow Up     No follow-ups on file.  Patient was given instructions and counseling regarding her condition or for health maintenance advice. Please see specific information pulled into the AVS if appropriate.

## 2024-09-03 DIAGNOSIS — E66.9 OBESITY (BMI 35.0-39.9 WITHOUT COMORBIDITY): Primary | ICD-10-CM

## 2024-10-04 DIAGNOSIS — E66.9 OBESITY (BMI 35.0-39.9 WITHOUT COMORBIDITY): Primary | ICD-10-CM

## 2024-11-12 DIAGNOSIS — E66.9 OBESITY (BMI 35.0-39.9 WITHOUT COMORBIDITY): ICD-10-CM

## 2024-11-18 ENCOUNTER — TELEPHONE (OUTPATIENT)
Dept: FAMILY MEDICINE CLINIC | Facility: CLINIC | Age: 44
End: 2024-11-18
Payer: COMMERCIAL

## 2024-11-18 DIAGNOSIS — Z79.899 HIGH RISK MEDICATION USE: Primary | ICD-10-CM

## 2024-11-18 DIAGNOSIS — Z13.6 SCREENING FOR ISCHEMIC HEART DISEASE: ICD-10-CM

## 2024-11-18 DIAGNOSIS — D50.9 IRON DEFICIENCY ANEMIA, UNSPECIFIED IRON DEFICIENCY ANEMIA TYPE: ICD-10-CM

## 2024-11-18 DIAGNOSIS — Z00.00 ANNUAL PHYSICAL EXAM: ICD-10-CM

## 2024-11-18 DIAGNOSIS — E66.9 OBESITY (BMI 35.0-39.9 WITHOUT COMORBIDITY): ICD-10-CM

## 2024-11-18 DIAGNOSIS — E55.9 VITAMIN D DEFICIENCY: ICD-10-CM

## 2024-11-18 DIAGNOSIS — E53.8 B12 DEFICIENCY: ICD-10-CM

## 2024-11-18 DIAGNOSIS — Z13.29 THYROID DISORDER SCREENING: ICD-10-CM

## 2024-11-18 NOTE — TELEPHONE ENCOUNTER
----- Message from Rosalina RAMIREZ sent at 11/13/2024 11:19 AM EST -----  Pt has labs on 11/20--Please place orders when available

## 2024-11-19 DIAGNOSIS — Z00.00 ANNUAL PHYSICAL EXAM: ICD-10-CM

## 2024-11-19 DIAGNOSIS — E55.9 VITAMIN D DEFICIENCY: ICD-10-CM

## 2024-11-19 DIAGNOSIS — Z13.6 SCREENING FOR ISCHEMIC HEART DISEASE: ICD-10-CM

## 2024-11-19 DIAGNOSIS — E66.9 OBESITY (BMI 35.0-39.9 WITHOUT COMORBIDITY): ICD-10-CM

## 2024-11-19 DIAGNOSIS — Z13.29 THYROID DISORDER SCREENING: ICD-10-CM

## 2024-11-19 DIAGNOSIS — D50.9 IRON DEFICIENCY ANEMIA, UNSPECIFIED IRON DEFICIENCY ANEMIA TYPE: ICD-10-CM

## 2024-11-21 LAB
25(OH)D3+25(OH)D2 SERPL-MCNC: 37.3 NG/ML (ref 30–100)
ALBUMIN SERPL-MCNC: 4.3 G/DL (ref 3.5–5.2)
ALBUMIN/GLOB SERPL: 1.7 G/DL
ALP SERPL-CCNC: 92 U/L (ref 39–117)
ALT SERPL-CCNC: 35 U/L (ref 1–33)
AST SERPL-CCNC: 28 U/L (ref 1–32)
BASOPHILS # BLD AUTO: 0.04 10*3/MM3 (ref 0–0.2)
BASOPHILS NFR BLD AUTO: 0.7 % (ref 0–1.5)
BILIRUB SERPL-MCNC: 0.5 MG/DL (ref 0–1.2)
BUN SERPL-MCNC: 13 MG/DL (ref 6–20)
BUN/CREAT SERPL: 16.5 (ref 7–25)
CALCIUM SERPL-MCNC: 9.5 MG/DL (ref 8.6–10.5)
CHLORIDE SERPL-SCNC: 107 MMOL/L (ref 98–107)
CHOLEST SERPL-MCNC: 200 MG/DL (ref 0–200)
CO2 SERPL-SCNC: 26.3 MMOL/L (ref 22–29)
CREAT SERPL-MCNC: 0.79 MG/DL (ref 0.57–1)
EGFRCR SERPLBLD CKD-EPI 2021: 94.1 ML/MIN/1.73
EOSINOPHIL # BLD AUTO: 0.19 10*3/MM3 (ref 0–0.4)
EOSINOPHIL NFR BLD AUTO: 3.3 % (ref 0.3–6.2)
ERYTHROCYTE [DISTWIDTH] IN BLOOD BY AUTOMATED COUNT: 12.5 % (ref 12.3–15.4)
GLOBULIN SER CALC-MCNC: 2.6 GM/DL
GLUCOSE SERPL-MCNC: 104 MG/DL (ref 65–99)
HBA1C MFR BLD: 5.2 % (ref 4.8–5.6)
HCT VFR BLD AUTO: 36.7 % (ref 34–46.6)
HDLC SERPL-MCNC: 31 MG/DL (ref 40–60)
HGB BLD-MCNC: 12.4 G/DL (ref 12–15.9)
IMM GRANULOCYTES # BLD AUTO: 0.01 10*3/MM3 (ref 0–0.05)
IMM GRANULOCYTES NFR BLD AUTO: 0.2 % (ref 0–0.5)
IRON SATN MFR SERPL: 14 % (ref 20–50)
IRON SERPL-MCNC: 54 MCG/DL (ref 37–145)
LDLC SERPL CALC-MCNC: 120 MG/DL (ref 0–100)
LYMPHOCYTES # BLD AUTO: 1.52 10*3/MM3 (ref 0.7–3.1)
LYMPHOCYTES NFR BLD AUTO: 26.7 % (ref 19.6–45.3)
MCH RBC QN AUTO: 29.7 PG (ref 26.6–33)
MCHC RBC AUTO-ENTMCNC: 33.8 G/DL (ref 31.5–35.7)
MCV RBC AUTO: 87.8 FL (ref 79–97)
MONOCYTES # BLD AUTO: 0.33 10*3/MM3 (ref 0.1–0.9)
MONOCYTES NFR BLD AUTO: 5.8 % (ref 5–12)
NEUTROPHILS # BLD AUTO: 3.6 10*3/MM3 (ref 1.7–7)
NEUTROPHILS NFR BLD AUTO: 63.3 % (ref 42.7–76)
NRBC BLD AUTO-RTO: 0 /100 WBC (ref 0–0.2)
PLATELET # BLD AUTO: 297 10*3/MM3 (ref 140–450)
POTASSIUM SERPL-SCNC: 4.6 MMOL/L (ref 3.5–5.2)
PROT SERPL-MCNC: 6.9 G/DL (ref 6–8.5)
RBC # BLD AUTO: 4.18 10*6/MM3 (ref 3.77–5.28)
SODIUM SERPL-SCNC: 142 MMOL/L (ref 136–145)
TIBC SERPL-MCNC: 396 MCG/DL
TRIGL SERPL-MCNC: 275 MG/DL (ref 0–150)
TSH SERPL DL<=0.005 MIU/L-ACNC: 1.14 UIU/ML (ref 0.27–4.2)
UIBC SERPL-MCNC: 342 MCG/DL (ref 112–346)
VLDLC SERPL CALC-MCNC: 49 MG/DL (ref 5–40)
WBC # BLD AUTO: 5.69 10*3/MM3 (ref 3.4–10.8)

## 2024-11-25 ENCOUNTER — OFFICE VISIT (OUTPATIENT)
Dept: FAMILY MEDICINE CLINIC | Facility: CLINIC | Age: 44
End: 2024-11-25
Payer: COMMERCIAL

## 2024-11-25 VITALS
OXYGEN SATURATION: 98 % | WEIGHT: 225 LBS | HEART RATE: 72 BPM | BODY MASS INDEX: 34.1 KG/M2 | SYSTOLIC BLOOD PRESSURE: 124 MMHG | DIASTOLIC BLOOD PRESSURE: 78 MMHG | TEMPERATURE: 97.2 F | HEIGHT: 68 IN

## 2024-11-25 DIAGNOSIS — E66.9 OBESITY (BMI 35.0-39.9 WITHOUT COMORBIDITY): ICD-10-CM

## 2024-11-25 DIAGNOSIS — G43.809 OTHER MIGRAINE WITHOUT STATUS MIGRAINOSUS, NOT INTRACTABLE: ICD-10-CM

## 2024-11-25 DIAGNOSIS — L30.9 DERMATITIS: ICD-10-CM

## 2024-11-25 DIAGNOSIS — Z00.00 ENCOUNTER FOR HEALTH MAINTENANCE EXAMINATION IN ADULT: Primary | ICD-10-CM

## 2024-11-25 PROCEDURE — 99396 PREV VISIT EST AGE 40-64: CPT | Performed by: STUDENT IN AN ORGANIZED HEALTH CARE EDUCATION/TRAINING PROGRAM

## 2024-11-25 RX ORDER — CLOTRIMAZOLE AND BETAMETHASONE DIPROPIONATE 10; .64 MG/G; MG/G
CREAM TOPICAL 2 TIMES DAILY
Qty: 45 G | Refills: 5 | Status: SHIPPED | OUTPATIENT
Start: 2024-11-25

## 2024-11-25 RX ORDER — ZOLMITRIPTAN 2.5 MG/1
2.5 TABLET, FILM COATED ORAL CONTINUOUS PRN
Qty: 12 TABLET | Refills: 5 | Status: SHIPPED | OUTPATIENT
Start: 2024-11-25 | End: 2024-12-20

## 2024-11-26 NOTE — PROGRESS NOTES
"Chief Complaint  Annual Exam (Pt has no concerns.)    Subjective        Mel Calero presents to [facility]  History of Present Illness  44-year-old female with asthma, GERD who presents for annual exam.    Previously undergoing IVF - no longer pursuing this. Proceeding with adoption. Has paperwork for me to fill out but does not have it with her.      Headaches are well controlled now that her menstrual cycles are irregular.  She does take Zomig for it.  She will initially try ibuprofen and Excedrin as she does have side effects from Zomig-fatigue sleepiness.    History of B12 and iron deficiency.  She states that iron supplementation previously caused her to gain a lot of weight. Currently off iron. Does have increased menorrhagia lately now that she is no longer undergoing IVF. Hx of fibroids. Interested in potential for partial hysterectomy which she plans to discuss with GYN at upcoming visit.      Objective   Vital Signs:  /78   Pulse 72   Temp 97.2 °F (36.2 °C)   Ht 172.7 cm (68\")   Wt 102 kg (225 lb)   SpO2 98%   BMI 34.21 kg/m²   Estimated body mass index is 34.21 kg/m² as calculated from the following:    Height as of this encounter: 172.7 cm (68\").    Weight as of this encounter: 102 kg (225 lb).            Physical Exam  Constitutional:       General: She is not in acute distress.  Eyes:      Conjunctiva/sclera: Conjunctivae normal.   Cardiovascular:      Rate and Rhythm: Normal rate and regular rhythm.   Pulmonary:      Effort: Pulmonary effort is normal. No respiratory distress.      Breath sounds: Normal breath sounds.   Abdominal:      Palpations: Abdomen is soft.      Tenderness: There is no abdominal tenderness.   Skin:     General: Skin is warm and dry.   Neurological:      Mental Status: She is alert and oriented to person, place, and time.   Psychiatric:         Mood and Affect: Mood normal.         Behavior: Behavior normal.        Result Review " :  The following data was reviewed by: Kayla Song MD on 11/25/2024:  Common labs          11/20/2024    09:10   Common Labs   Glucose 104    BUN 13    Creatinine 0.79    Sodium 142    Potassium 4.6    Chloride 107    Calcium 9.5    Total Protein 6.9    Albumin 4.3    Total Bilirubin 0.5    Alkaline Phosphatase 92    AST (SGOT) 28    ALT (SGPT) 35    WBC 5.69    Hemoglobin 12.4    Hematocrit 36.7    Platelets 297    Total Cholesterol 200    Triglycerides 275    HDL Cholesterol 31    LDL Cholesterol  120    Hemoglobin A1C 5.20      Data reviewed : none           Assessment and Plan   Diagnoses and all orders for this visit:    1. Encounter for health maintenance examination in adult (Primary)  Assessment & Plan:  Colonoscopy: last 4/2024, repeat 10yrs  Cervical Cancer Screening: UTD with GYN  Mammogram: UTD with GYN  LDCT: never smoker  DEXA: indicated at age 64    Immunizations: eligible for Tdap, flu, COVID  Labs: reviewed today  The 10-year ASCVD risk score (Patricia LANDEROS, et al., 2019) is: 1.9%    Values used to calculate the score:      Age: 44 years      Sex: Female      Is Non- : No      Diabetic: No      Tobacco smoker: No      Systolic Blood Pressure: 124 mmHg      Is BP treated: No      HDL Cholesterol: 31 mg/dL      Total Cholesterol: 200 mg/dL      Patient was counseled in regards to maintaining a healthy lifestyle, rich in whole grains, fruits and vegetables. Limit high saturated fats and processed sugars. Maintain an active lifestyle to promote overall health and well being.         2. Obesity (BMI 35.0-39.9 without comorbidity)  -     Semaglutide-Weight Management 1.7 MG/0.75ML solution auto-injector; Inject 0.75 mL under the skin into the appropriate area as directed 1 (One) Time Per Week.  Dispense: 3 mL; Refill: 2    3. Other migraine without status migrainosus, not intractable  -     ZOLMitriptan (ZOMIG) 2.5 MG tablet; Take 1 tablet by mouth Continuous As Needed for Migraine  for up to 25 days.  Dispense: 12 tablet; Refill: 5    4. Dermatitis  -     clotrimazole-betamethasone (LOTRISONE) 1-0.05 % cream; Apply  topically to the appropriate area as directed 2 (Two) Times a Day.  Dispense: 45 g; Refill: 5             Follow Up   No follow-ups on file.  Patient was given instructions and counseling regarding her condition or for health maintenance advice. Please see specific information pulled into the AVS if appropriate.

## 2024-12-02 PROBLEM — Z00.00 ENCOUNTER FOR HEALTH MAINTENANCE EXAMINATION IN ADULT: Status: ACTIVE | Noted: 2024-12-02

## 2024-12-03 NOTE — ASSESSMENT & PLAN NOTE
Colonoscopy: last 4/2024, repeat 10yrs  Cervical Cancer Screening: UTD with GYN  Mammogram: UTD with GYN  LDCT: never smoker  DEXA: indicated at age 64    Immunizations: eligible for Tdap, flu, COVID  Labs: reviewed today  The 10-year ASCVD risk score (Patricia LANDEROS, et al., 2019) is: 1.9%    Values used to calculate the score:      Age: 44 years      Sex: Female      Is Non- : No      Diabetic: No      Tobacco smoker: No      Systolic Blood Pressure: 124 mmHg      Is BP treated: No      HDL Cholesterol: 31 mg/dL      Total Cholesterol: 200 mg/dL      Patient was counseled in regards to maintaining a healthy lifestyle, rich in whole grains, fruits and vegetables. Limit high saturated fats and processed sugars. Maintain an active lifestyle to promote overall health and well being.

## 2025-01-14 PROBLEM — M79.89 SWELLING OF LOWER LIMB: Status: RESOLVED | Noted: 2019-07-25 | Resolved: 2025-01-14

## 2025-01-14 PROBLEM — R19.5 POSITIVE FECAL OCCULT BLOOD TEST: Status: RESOLVED | Noted: 2024-03-26 | Resolved: 2025-01-14

## 2025-01-14 PROBLEM — M79.606 PAIN IN LOWER LIMB: Status: RESOLVED | Noted: 2019-07-25 | Resolved: 2025-01-14

## 2025-01-14 PROBLEM — N92.0 MENORRHAGIA: Status: RESOLVED | Noted: 2022-08-17 | Resolved: 2025-01-14

## 2025-01-14 PROBLEM — R60.9 EDEMA: Status: RESOLVED | Noted: 2019-07-25 | Resolved: 2025-01-14

## 2025-02-04 ENCOUNTER — PATIENT ROUNDING (BHMG ONLY) (OUTPATIENT)
Dept: URGENT CARE | Facility: CLINIC | Age: 45
End: 2025-02-04
Payer: COMMERCIAL

## 2025-02-04 NOTE — ED NOTES
Thank you for letting us care for you during your recent visit at [facility]. We would love to hear about your experience with us.     We’re always looking for ways to make our patients’ experiences even better. Do you have any recommendations on ways we may improve?    I appreciate you taking the time to respond. Please be on the lookout for a survey about your recent visit from [facility] via text or email. We would greatly appreciate if you could fill that out and turn it back in. We want your voice to be heard and we value your feedback.     Thank you for choosing [facility] for your healthcare needs.

## 2025-02-10 ENCOUNTER — OFFICE VISIT (OUTPATIENT)
Dept: ORTHOPEDIC SURGERY | Facility: CLINIC | Age: 45
End: 2025-02-10
Payer: COMMERCIAL

## 2025-02-10 VITALS
DIASTOLIC BLOOD PRESSURE: 79 MMHG | BODY MASS INDEX: 34.06 KG/M2 | HEART RATE: 91 BPM | WEIGHT: 217 LBS | SYSTOLIC BLOOD PRESSURE: 112 MMHG | HEIGHT: 67 IN

## 2025-02-10 DIAGNOSIS — M22.2X1 PFD (PATELLOFEMORAL DISORDER), RIGHT: ICD-10-CM

## 2025-02-10 DIAGNOSIS — M25.561 ACUTE PAIN OF RIGHT KNEE: Primary | ICD-10-CM

## 2025-02-10 DIAGNOSIS — M22.41 CHONDROMALACIA, PATELLA, RIGHT: ICD-10-CM

## 2025-02-10 PROCEDURE — 99203 OFFICE O/P NEW LOW 30 MIN: CPT | Performed by: NURSE PRACTITIONER

## 2025-02-10 NOTE — PROGRESS NOTES
Subjective:     Patient ID: Mel Calero is a 44 y.o. female.    Chief Complaint:  Right knee pain, new patient exam  History of Present Illness  History of Present Illness  The patient presents to the clinic today for evaluation of her right knee.    She experienced some pain in early 2025 along the lateral aspect of the knee. She did try to turn over and the knee caused her significant pain to where she saw white. She rates it as a 3 out of 10, describing it as aching, shooting, grinding, giving away, clicking, and popping. She has experienced a clicking and popping at the knee for quite some time. She is experiencing stiffness, worse with seated for extended periods of time, clicking, popping, giving away. Pain made worse with again seated, driving, icing, descending stairs, and walking which she does do quite frequently through an airport. She is currently taking magnesium and other supplements for her muscle cramping and pain. She reports no prior injury to the knee in the past. She reports no prior x-ray, MRI or CT, and has no other concerns at present. She did try some PT tape, which seemed to provide her significant symptom improvement.    MEDICATIONS  Magnesium.       Social History     Occupational History    Not on file   Tobacco Use    Smoking status: Former     Current packs/day: 0.00     Types: Cigarettes     Quit date: 2003     Years since quittin.7     Passive exposure: Current    Smokeless tobacco: Never    Tobacco comments:     Very minimal, social smoking only   Vaping Use    Vaping status: Never Used   Substance and Sexual Activity    Alcohol use: Yes     Comment: Do not average 1 per week    Drug use: Never    Sexual activity: Yes     Partners: Male     Comment: Undergoing fertility treatment; IVF tentatively in May      Past Medical History:   Diagnosis Date    Allergic     Anemia 2019    Was treated with iron supplements at the time; now only B12    Anxiety     Asthma      "Eczema     Esophageal stricture     Fissure, anal     Since childhood    GERD (gastroesophageal reflux disease)     History of uterine fibroid     Migraines     Obesity     Psoriasis of scalp     Rectal bleeding     Seasonal allergies     Urethral cyst      Past Surgical History:   Procedure Laterality Date    COLONOSCOPY N/A 04/10/2024    ENTIRE COLON WNL, MILD INTERNAL HEMORRHOIDS, RESCOPE IN 10 YRS, DR. LYNNE RICHMOND AT [facility]    CYSTOSCOPY URETHRAL DIVERTICULUM REPAIR/EXCISION N/A 11/04/2020    Procedure: EXCISION OF URETHRAL CYST AND CYSTOSCOPY;  Surgeon: Shoaib Baxter MD;  Location: [facility] MAIN OR;  Service: Urology;  Laterality: N/A;    ENDOSCOPY      ESOPHAGEAL DILATATION      EYE SURGERY  2012    Retina buckle    MYOMECTOMY  2012    x2 (davinci in 2012 and open in 8/2022)    RETINAL DETACHMENT REPAIR  2006       Family History   Problem Relation Age of Onset    Heart disease Father     Stroke Father     Diabetes Father     Diabetes Sister     Hypertension Sister     Anxiety disorder Sister     Diabetes Paternal Uncle     Diabetes Paternal Uncle     Congenital heart disease Maternal Grandmother     Heart disease Maternal Grandmother         Congenital heart failure, pacemaker reliant    Heart disease Maternal Grandfather         smoker    Stroke Maternal Grandfather     Aneurysm Maternal Grandfather     Malig Hyperthermia Neg Hx                Objective:  Physical Exam    Vital signs reviewed.   General: No acute distress.  Eyes: conjunctiva clear; pupils equally round and reactive  ENT: external ears and nose atraumatic; oropharynx clear  CV: no peripheral edema  Resp: normal respiratory effort  Skin: no rashes or wounds; normal turgor  Psych: mood and affect appropriate; recent and remote memory intact    Vitals:    02/10/25 0857   BP: 112/79   Pulse: 91   Weight: 98.4 kg (217 lb)   Height: 170.2 cm (67\")         02/10/25  0857   Weight: 98.4 kg (217 lb)     Body mass index is 33.99 kg/m².      Right " Knee Exam     Tenderness   The patient is experiencing tenderness in the patella.    Range of Motion   Extension:  0   Right knee flexion: 125.     Tests   Freddy:  Medial - negative Lateral - negative  Varus: negative Valgus: negative  Lachman:  Anterior - 1+      Drawer:  Anterior - negative    Posterior - negative  Patellar apprehension: positive    Other   Erythema: absent  Sensation: normal  Pulse: present  Effusion: no effusion present    Comments:  Positive active patellar compression test  Positive tenderness along the lateral patellar facet  Quad Strength 4 out of 5             Physical Exam      Imaging:  Right Knee X-Ray  Indication: Pain    AP, Lateral, and [place] views    Findings:  No fracture  No bony lesion  Normal soft tissues  Patellofemoral narrowing slight reactive osteophytes noted    No prior studies were available for comparison.    Assessment:        1. Acute pain of right knee    2. PFD (patellofemoral disorder), right    3. Chondromalacia, patella, right         Assessment & Plan  1. Right knee pain.  She reports experiencing pain along the lateral aspect of her right knee since early January, exacerbated by activities such as driving, descending stairs, and walking. Symptoms include aching, shooting, grinding, giving away, clicking, and popping. She has tried PT tape, which provided significant symptom improvement. There is no history of prior injury or imaging. A comprehensive treatment plan was discussed. The primary objective is to alleviate the pain through strengthening exercises and patellar stabilization. All questions were addressed, and she was encouraged to reach out with any further concerns. She will use a true pull brace, especially when navigating through an airport. Straight leg raises for quadriceps strengthening exercises are recommended. She did well with KT tape to provide stability to the patella. If there is no improvement, she will be seen back in the clinic, and  advanced imaging will be considered.    Orders:  Orders Placed This Encounter   Procedures    XR Knee 3+ View With [place] Right     No orders of the defined types were placed in this encounter.      Dragon dictation utilized          Patient or patient representative verbalized consent for the use of Ambient Listening during the visit with  GWENDOLYN Suarez for chart documentation. 2/10/2025  09:40 EST

## 2025-02-11 ENCOUNTER — PATIENT ROUNDING (BHMG ONLY) (OUTPATIENT)
Dept: ORTHOPEDIC SURGERY | Facility: CLINIC | Age: 45
End: 2025-02-11
Payer: COMMERCIAL

## 2025-02-11 NOTE — PROGRESS NOTES
A My-Chart message has been sent to the patient for PATIENT ROUNDING with [facility] [place] [place] [facility].

## 2025-05-11 DIAGNOSIS — L30.9 DERMATITIS: ICD-10-CM

## 2025-05-12 RX ORDER — CLOTRIMAZOLE AND BETAMETHASONE DIPROPIONATE 10; .64 MG/G; MG/G
CREAM TOPICAL
Qty: 45 G | Refills: 5 | Status: SHIPPED | OUTPATIENT
Start: 2025-05-12

## (undated) DEVICE — KT ORCA ORCAPOD DISP STRL

## (undated) DEVICE — MEDI-VAC YANKAUER SUCTION HANDLE W/BULBOUS TIP: Brand: CARDINAL HEALTH

## (undated) DEVICE — LN SMPL CO2 SHTRM SD STREAM W/M LUER

## (undated) DEVICE — CATH FOL SIMPLYLATEX SILELAST 16F 17IN

## (undated) DEVICE — PAD SANI MAXI W/ADHS SNG WRP 11IN

## (undated) DEVICE — TUBING, SUCTION, 1/4" X 10', STRAIGHT: Brand: MEDLINE

## (undated) DEVICE — COVER,MAYO STAND,STERILE: Brand: MEDLINE

## (undated) DEVICE — ANTIBACTERIAL UNDYED BRAIDED (POLYGLACTIN 910), SYNTHETIC ABSORBABLE SUTURE: Brand: COATED VICRYL

## (undated) DEVICE — PENCL E/S ULTRAVAC TELESCP NOSE HOLSTR 10FT

## (undated) DEVICE — ADAPT CLN BIOGUARD AIR/H2O DISP

## (undated) DEVICE — CANN O2 ETCO2 FITS ALL CONN CO2 SMPL A/ 7IN DISP LF

## (undated) DEVICE — DRAPE,LITHOTOMY,ATTACHED,STERILE: Brand: MEDLINE

## (undated) DEVICE — INTENDED FOR TISSUE SEPARATION, AND OTHER PROCEDURES THAT REQUIRE A SHARP SURGICAL BLADE TO PUNCTURE OR CUT.: Brand: BARD-PARKER ® CARBON RIB-BACK BLADES

## (undated) DEVICE — TIDISHIELD UROLOGY DRAIN BAGS FROSTY VINYL STERILE FITS SIEMENS UROSKOP ACCESS 20 PER CASE: Brand: TIDISHIELD

## (undated) DEVICE — LOU MINOR PROCEDURE: Brand: MEDLINE INDUSTRIES, INC.

## (undated) DEVICE — SUT SILK 2/0 SH CR5 18IN C0125

## (undated) DEVICE — NDL HYPO PRECISIONGLIDE REG 25G 1 1/2

## (undated) DEVICE — SUT GUT CHRM 4/0 RB1 27IN U203H

## (undated) DEVICE — ST IRR CYSTO W/SPK 77IN LF

## (undated) DEVICE — SENSR O2 OXIMAX FNGR A/ 18IN NONSTR

## (undated) DEVICE — GLV SURG BIOGEL LTX PF 8

## (undated) DEVICE — TRAP FLD MINIVAC MEGADYNE 100ML